# Patient Record
Sex: FEMALE | Race: BLACK OR AFRICAN AMERICAN | Employment: PART TIME | URBAN - METROPOLITAN AREA
[De-identification: names, ages, dates, MRNs, and addresses within clinical notes are randomized per-mention and may not be internally consistent; named-entity substitution may affect disease eponyms.]

---
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
## 2019-01-28 ENCOUNTER — HOSPITAL ENCOUNTER (EMERGENCY)
Age: 19
Discharge: HOME | End: 2019-01-28
Attending: EMERGENCY MEDICINE
Payer: SELF-PAY

## 2019-01-28 VITALS
WEIGHT: 186 LBS | RESPIRATION RATE: 16 BRPM | SYSTOLIC BLOOD PRESSURE: 121 MMHG | HEIGHT: 66 IN | BODY MASS INDEX: 29.89 KG/M2 | DIASTOLIC BLOOD PRESSURE: 74 MMHG | HEART RATE: 86 BPM | OXYGEN SATURATION: 98 % | TEMPERATURE: 98.6 F

## 2019-01-28 DIAGNOSIS — H10.31 ACUTE CONJUNCTIVITIS OF RIGHT EYE, UNSPECIFIED ACUTE CONJUNCTIVITIS TYPE: Primary | ICD-10-CM

## 2019-01-28 PROCEDURE — 99282 EMERGENCY DEPT VISIT SF MDM: CPT

## 2019-01-28 RX ORDER — ERYTHROMYCIN 5 MG/G
OINTMENT OPHTHALMIC
Qty: 1 G | Refills: 0 | Status: SHIPPED | OUTPATIENT
Start: 2019-01-28 | End: 2019-02-04

## 2019-01-28 NOTE — LETTER
NOTIFICATION OF RETURN TO WORK / SCHOOL 
1/28/2019 Ms. Julio Bernard [address] [facility] [address] To Whom it may concern, Please excuse Julio Bernard from work 01/28/19 for medical reasons. She may return as of 1/30/19.    
 
 
 
 
 
 
Sincerely,  
 
 
 
 
Jp Randhawa PA-C

## 2019-01-28 NOTE — DISCHARGE INSTRUCTIONS

## 2019-01-28 NOTE — ED TRIAGE NOTES
Pt. States \"I think I got pink eye\" observed redness and tearing to right eye. Reports symptoms started last night.

## 2019-01-28 NOTE — ED PROVIDER NOTES
EMERGENCY DEPARTMENT HISTORY AND PHYSICAL EXAM    1:27 PM      Date: 1/28/2019  Patient Name: Christina Danielle    History of Presenting Illness     Chief Complaint   Patient presents with    Red Eye     right eye         History Provided By: Patient    Chief Complaint: Eye redness   Duration:  Last night   Timing:  Constant  Location: right   Quality: Aching and Burning  Severity: Not reported   Modifying Factors: No modifying or aggravating factors were reported. Associated Symptoms: eye discharge (right)      Additional History (Context): Christina Danielle is a 22 y.o. female with PMHx of asthma who presents with c/o constant aching and burning right eye redness that started last night with associated Sx of eye drainage (right). Pt states her co worker had pink eye and she may have been exposed to something the coworker touched. Pt doesn't wear contacts. Pt denies visual disturbance. Denies any further complaints or symptoms at the moment. PCP: None    Current Outpatient Medications   Medication Sig Dispense Refill    erythromycin (ILOTYCIN) ophthalmic ointment One half inch (1.25cm) four times daily to affected eye for 5-7 days until symptoms improve 1 g 0    etonogestrel (NEXPLANON) 68 mg impl by SubDERmal route.  ibuprofen (MOTRIN) 600 mg tablet Take 1 Tab by mouth every six (6) hours as needed for Pain. 40 Tab 0    methocarbamol (ROBAXIN-750) 750 mg tablet Take 1 Tab by mouth every six (6) hours as needed. 40 Tab 0       Past History     Past Medical History:  Past Medical History:   Diagnosis Date    Asthma        Past Surgical History:  No past surgical history on file. Family History:  No family history on file. Social History:  Social History     Tobacco Use    Smoking status: Never Smoker   Substance Use Topics    Alcohol use: No    Drug use: No       Allergies:  No Known Allergies      Review of Systems       Review of Systems   Constitutional: Negative for fever.    HENT: Negative for facial swelling. Eyes: Positive for pain (right), discharge (right) and redness (right). Negative for visual disturbance. Respiratory: Negative for shortness of breath. Cardiovascular: Negative for chest pain. Gastrointestinal: Negative for abdominal pain. Genitourinary: Negative for dysuria. Musculoskeletal: Negative for neck pain. Skin: Negative for rash. Neurological: Negative for dizziness. Psychiatric/Behavioral: Negative for confusion. All other systems reviewed and are negative. Physical Exam     Visit Vitals  /74 (BP 1 Location: Left arm, BP Patient Position: Sitting)   Pulse 86   Temp 98.6 °F (37 °C)   Resp 16   Ht 5' 6\" (1.676 m)   Wt 84.4 kg (186 lb)   SpO2 98%   BMI 30.02 kg/m²         Physical Exam   Constitutional: She is oriented to person, place, and time. She appears well-developed and well-nourished. No distress. HENT:   Head: Normocephalic and atraumatic. Eyes: Conjunctivae are normal.   Right conjunctival injection with mild inflammation of upper and lower eyelids and yellow crusting discharge    Neck: Normal range of motion. Cardiovascular: Normal rate and regular rhythm. Pulmonary/Chest: Effort normal.   Abdominal: She exhibits no distension. Musculoskeletal: Normal range of motion. Neurological: She is alert and oriented to person, place, and time. Skin: Skin is warm and dry. She is not diaphoretic. Psychiatric: She has a normal mood and affect. Nursing note and vitals reviewed. Diagnostic Study Results     Labs -  No results found for this or any previous visit (from the past 12 hour(s)). Radiologic Studies -   No orders to display         Medical Decision Making   I am the first provider for this patient. I reviewed the vital signs, available nursing notes, past medical history, past surgical history, family history and social history. Vital Signs-Reviewed the patient's vital signs.       Records Reviewed: Nursing Notes and Old Medical Records (Time of Review: 1:27 PM)    ED Course: Progress Notes, Reevaluation, and Consults:      Provider Notes (Medical Decision Making): MDM       Pt has been reexamined. Treated for conjunctivitis. Patient has no new complaints, changes, or physical findings. Care plan outlined and precautions discussed. Results were reviewed with the patient. All medications were reviewed with the patient; will d/c home with erythromycin. All of pt's questions and concerns were addressed. Patient was instructed and agrees to follow up with pcp, as well as to return to the ED upon further deterioration. Patient is ready to go home. Diagnosis     Clinical Impression:   1. Acute conjunctivitis of right eye, unspecified acute conjunctivitis type        Disposition: Discharged      Follow-up Information     Follow up With Specialties Details Why Contact Info    [facility] EMERGENCY DEPT Emergency Medicine In 1 week If symptoms do not improve [address] [facility] [address]  [phone]    [facility] EMERGENCY DEPT Emergency Medicine  Immediately if symptoms worsen [address] [id number]  [phone]              Medication List      START taking these medications    erythromycin ophthalmic ointment  Commonly known as:  ILOTYCIN  One half inch (1.25cm) four times daily to affected eye for 5-7 days until symptoms improve        ASK your doctor about these medications    ibuprofen 600 mg tablet  Commonly known as:  MOTRIN  Take 1 Tab by mouth every six (6) hours as needed for Pain. methocarbamol 750 mg tablet  Commonly known as:  ROBAXIN-750  Take 1 Tab by mouth every six (6) hours as needed.      NEXPLANON 68 mg Impl  Generic drug:  etonogestrel           Where to Get Your Medications      Information about where to get these medications is not yet available    Ask your nurse or doctor about these medications  · erythromycin ophthalmic ointment _______________________________    Attestations:  Antoinetteелена Mary [address] acting as a scribe for and in the presence of Karen Jay PA-C      January 28, 2019 at 1:27 PM       Provider Attestation:      I personally performed the services described in the documentation, reviewed the documentation, as recorded by the scribe in my presence, and it accurately and completely records my words and actions.  January 28, 2019 at 1:27 PM - Karen Jay PA-C  _______________________________

## 2020-05-17 ENCOUNTER — HOSPITAL ENCOUNTER (EMERGENCY)
Age: 20
Discharge: STILL ADMITTED OR ADMINISTRATIVE | End: 2020-05-17

## 2020-05-30 ENCOUNTER — HOSPITAL ENCOUNTER (EMERGENCY)
Age: 20
Discharge: HOME | End: 2020-05-30
Attending: EMERGENCY MEDICINE
Payer: COMMERCIAL

## 2020-05-30 VITALS
HEIGHT: 66 IN | SYSTOLIC BLOOD PRESSURE: 117 MMHG | DIASTOLIC BLOOD PRESSURE: 80 MMHG | HEART RATE: 83 BPM | RESPIRATION RATE: 16 BRPM | BODY MASS INDEX: 31.18 KG/M2 | WEIGHT: 194 LBS | OXYGEN SATURATION: 98 % | TEMPERATURE: 98.9 F

## 2020-05-30 DIAGNOSIS — J03.90 ACUTE TONSILLITIS, UNSPECIFIED ETIOLOGY: Primary | ICD-10-CM

## 2020-05-30 PROCEDURE — 99281 EMR DPT VST MAYX REQ PHY/QHP: CPT

## 2020-05-30 RX ORDER — AMOXICILLIN 875 MG/1
875 TABLET, FILM COATED ORAL 2 TIMES DAILY
Qty: 20 TAB | Refills: 0 | Status: SHIPPED | OUTPATIENT
Start: 2020-05-30 | End: 2020-06-09

## 2020-05-30 NOTE — ED PROVIDER NOTES
EMERGENCY DEPARTMENT HISTORY AND PHYSICAL EXAM      Date: 5/30/2020  Patient Name: Clark Springer    History of Presenting Illness     Chief Complaint   Patient presents with    Sore Throat    Ear Pain       History Provided By: Patient    HPI: Clark Springer, 20 y.o. female PMHx significant for asthma presents ambulatory to the ED with cc of sore throat with associated left ear pain x3 days. Denies fever/chills, drainage, trouble breathing or swallowing. Denies known exposure to COVID. Patient has not taken anything for symptoms. Rates pain 8/10. There are no other complaints, changes, or physical findings at this time. PCP: None    No current facility-administered medications on file prior to encounter. Current Outpatient Medications on File Prior to Encounter   Medication Sig Dispense Refill    etonogestrel (NEXPLANON) 68 mg impl by SubDERmal route.  ibuprofen (MOTRIN) 600 mg tablet Take 1 Tab by mouth every six (6) hours as needed for Pain. 40 Tab 0    methocarbamol (ROBAXIN-750) 750 mg tablet Take 1 Tab by mouth every six (6) hours as needed. 40 Tab 0       Past History     Past Medical History:  Past Medical History:   Diagnosis Date    Asthma        Past Surgical History:  No past surgical history on file. Family History:  No family history on file. Social History:  Social History     Tobacco Use    Smoking status: Never Smoker   Substance Use Topics    Alcohol use: No    Drug use: No       Allergies:  No Known Allergies      Review of Systems   Review of Systems   Constitutional: Negative for chills and fever. HENT: Positive for ear pain and sore throat. Negative for congestion, dental problem, drooling and ear discharge. Respiratory: Negative for shortness of breath. Cardiovascular: Negative for chest pain. Gastrointestinal: Negative for abdominal pain, nausea and vomiting. Genitourinary: Negative for flank pain.    Musculoskeletal: Negative for back pain and myalgias. Skin: Negative for color change, pallor, rash and wound. Neurological: Negative for dizziness, weakness and light-headedness. All other systems reviewed and are negative. Physical Exam   Physical Exam  Vitals signs and nursing note reviewed. Constitutional:       General: She is not in acute distress. Appearance: She is well-developed. Comments: Patient well-appearing in NAD   HENT:      Head: Normocephalic and atraumatic. Ears:      Comments: TMs clear b/l     Mouth/Throat:      Comments: Tonsils 1+ with erythema b/l  No exudates  No pharyngeal swelling  Eyes:      Conjunctiva/sclera: Conjunctivae normal.   Cardiovascular:      Rate and Rhythm: Normal rate and regular rhythm. Heart sounds: Normal heart sounds. Pulmonary:      Effort: Pulmonary effort is normal. No respiratory distress. Breath sounds: Normal breath sounds. Abdominal:      General: Bowel sounds are normal. There is no distension. Palpations: Abdomen is soft. Musculoskeletal: Normal range of motion. Skin:     General: Skin is warm. Findings: No rash. Neurological:      Mental Status: She is alert and oriented to person, place, and time. Psychiatric:         Behavior: Behavior normal.         Diagnostic Study Results     Labs -   No results found for this or any previous visit (from the past 12 hour(s)). Radiologic Studies -   No orders to display     CT Results  (Last 48 hours)    None        CXR Results  (Last 48 hours)    None          Medical Decision Making   I am the first provider for this patient. I reviewed the vital signs, available nursing notes, past medical history, past surgical history, family history and social history. Vital Signs-Reviewed the patient's vital signs.   Patient Vitals for the past 12 hrs:   Temp Pulse Resp BP SpO2   05/30/20 1226 98.9 °F (37.2 °C) 83 16 117/80 98 %       Records Reviewed: Nursing Notes and Old Medical Records    Provider Notes (Medical Decision Making):   DDx: Tonsillitis, Pharyngitis, OM, OE    21 yo F who presents with sore throat and left ear pain x3 days. On physical exam tonsils are 1+ with erythema. No exudate. TMs clear bilaterally. Will treat with antibiotic to treat tonsillitis. Patient nontoxic-appearing, afebrile, not tachycardic. Patient stable for prompt outpatient follow-up with PCP in 1-2 days. Patient given strict instructions to return if symptoms worsen. ED Course:   Initial assessment performed. The patients presenting problems have been discussed, and they are in agreement with the care plan formulated and outlined with them. I have encouraged them to ask questions as they arise throughout their visit. Disposition:  Discussed dx and treatment plan. Discussed importance of PCP follow up. All questions answered. Pt voiced they understood. Return if sx worsen. PLAN:  1. Current Discharge Medication List      START taking these medications    Details   amoxicillin (AMOXIL) 875 mg tablet Take 1 Tab by mouth two (2) times a day for 10 days. Qty: 20 Tab, Refills: 0           2. Follow-up Information     Follow up With Specialties Details Why [address] [facility]  Schedule an appointment as soon as possible for a visit in 1 day  Jose Hong [address]  [address]  [phone]        Return to ED if worse     Diagnosis     Clinical Impression:   1. Acute tonsillitis, unspecified etiology        Attestations:    Reather Cranker, PA    Please note that this dictation was completed with Moxie, the computer voice recognition software. Quite often unanticipated grammatical, syntax, homophones, and other interpretive errors are inadvertently transcribed by the computer software. Please disregard these errors. Please excuse any errors that have escaped final proofreading. Thank you.

## 2020-05-30 NOTE — DISCHARGE INSTRUCTIONS
Patient Education        Tonsillitis: Care Instructions  Your Care Instructions     Tonsillitis is an infection of the tonsils that is caused by bacteria or a virus. The tonsils are in the back of the throat and are part of the immune system. Tonsillitis typically lasts from a few days up to a couple of weeks. Tonsillitis caused by a virus goes away on its own. Tonsillitis caused by the bacteria that causes strep throat is treated with antibiotics. You and your doctor may consider surgery to remove the tonsils (tonsillectomy) if you have serious complications or repeat infections. Follow-up care is a key part of your treatment and safety. Be sure to make and go to all appointments, and call your doctor if you are having problems. It's also a good idea to know your test results and keep a list of the medicines you take. How can you care for yourself at home? · If your doctor prescribed antibiotics, take them as directed. Do not stop taking them just because you feel better. You need to take the full course of antibiotics. · Gargle with warm salt water. This helps reduce swelling and relieve discomfort. Gargle once an hour with 1 teaspoon of salt mixed in 8 fluid ounces of warm water. · Take an over-the-counter pain medicine, such as acetaminophen (Tylenol), ibuprofen (Advil, Motrin), or naproxen (Aleve). Be safe with medicines. Read and follow all instructions on the label. No one younger than 20 should take aspirin. It has been linked to Reye syndrome, a serious illness. · Be careful when taking over-the-counter cold or flu medicines and Tylenol at the same time. Many of these medicines have acetaminophen, which is Tylenol. Read the labels to make sure that you are not taking more than the recommended dose. Too much acetaminophen (Tylenol) can be harmful. · Try an over-the-counter throat spray to relieve throat pain. · Drink plenty of fluids. Fluids may help soothe an irritated throat.  Drink warm or cool liquids (whichever feels better). These include tea, soup, and juice. · Do not smoke, and avoid secondhand smoke. Smoking can make tonsillitis worse. If you need help quitting, talk to your doctor about stop-smoking programs and medicines. These can increase your chances of quitting for good. · Use a vaporizer or humidifier to add moisture to your bedroom. Follow the directions for cleaning the machine. When should you call for help? Call your doctor now or seek immediate medical care if:  · Your pain gets worse on one side of your throat. · You have a new or higher fever. · You notice changes in your voice. · You have trouble opening your mouth. · You have any trouble breathing. · You have much more trouble swallowing. · You have a fever with a stiff neck or a severe headache. · You are sensitive to light or feel very sleepy or confused. Watch closely for changes in your health, and be sure to contact your doctor if:  · You do not get better after 2 days. Where can you learn more? Go to http://reinaldo-gil.info/  Enter M655 in the search box to learn more about \"Tonsillitis: Care Instructions. \"  Current as of: July 29, 2019               Content Version: 12.5  © 6719-9129 Health[employer] Inc[employer]. Care instructions adapted under license by [employer] (which disclaims liability or warranty for this information). If you have questions about a medical condition or this instruction, always ask your healthcare professional. [place] [address] any warranty or liability for your use of this information.

## 2020-06-09 ENCOUNTER — HOSPITAL ENCOUNTER (EMERGENCY)
Age: 20
Discharge: HOME | End: 2020-06-10
Attending: EMERGENCY MEDICINE
Payer: COMMERCIAL

## 2020-06-09 VITALS
TEMPERATURE: 99 F | RESPIRATION RATE: 11 BRPM | HEART RATE: 83 BPM | DIASTOLIC BLOOD PRESSURE: 76 MMHG | BODY MASS INDEX: 31.02 KG/M2 | OXYGEN SATURATION: 98 % | SYSTOLIC BLOOD PRESSURE: 113 MMHG | HEIGHT: 66 IN | WEIGHT: 193 LBS

## 2020-06-09 DIAGNOSIS — H10.32 ACUTE CONJUNCTIVITIS OF LEFT EYE, UNSPECIFIED ACUTE CONJUNCTIVITIS TYPE: Primary | ICD-10-CM

## 2020-06-09 PROCEDURE — 99283 EMERGENCY DEPT VISIT LOW MDM: CPT

## 2020-06-09 RX ORDER — ERYTHROMYCIN 5 MG/G
OINTMENT OPHTHALMIC
Qty: 1 TUBE | Refills: 0 | Status: SHIPPED | OUTPATIENT
Start: 2020-06-09 | End: 2020-06-16

## 2020-06-09 RX ORDER — PROPARACAINE HYDROCHLORIDE 5 MG/ML
2 SOLUTION/ DROPS OPHTHALMIC
Status: DISCONTINUED | OUTPATIENT
Start: 2020-06-09 | End: 2020-06-10 | Stop reason: HOSPADM

## 2020-06-09 RX ORDER — ERYTHROMYCIN 5 MG/G
OINTMENT OPHTHALMIC
Qty: 1 TUBE | Refills: 0 | Status: SHIPPED | OUTPATIENT
Start: 2020-06-09 | End: 2020-06-09

## 2020-06-09 NOTE — LETTER
[address] [place] [address] [address] [facility] EMERGENCY DEPT 
[address] [facility] [address] [phone] Work/School Note Date: 6/9/2020 To Whom It May concern: 
 
Julita Fry was seen and treated today in the emergency room by the following provider(s): 
Attending Provider: Elizabeth Mahmood MD 
Nurse Practitioner: Austin Campos NP. Julita Fry may return to work on 06/12/2020. Sincerely, Raul Schmidt NP

## 2020-06-10 RX ORDER — ERYTHROMYCIN 5 MG/G
OINTMENT OPHTHALMIC
Qty: 3.5 G | Refills: 0 | Status: SHIPPED | OUTPATIENT
Start: 2020-06-10 | End: 2020-06-17

## 2020-06-10 NOTE — DISCHARGE INSTRUCTIONS
Patient Education        Pinkeye: Care Instructions  Your Care Instructions    Pinkeye is redness and swelling of the eye surface and the conjunctiva (the lining of the eyelid and the covering of the white part of the eye). Pinkeye is also called conjunctivitis. Pinkeye is often caused by infection with bacteria or a virus. Dry air, allergies, smoke, and chemicals are other common causes. Pinkeye often clears on its own in 7 to 10 days. Antibiotics only help if the pinkeye is caused by bacteria. Pinkeye caused by infection spreads easily. If an allergy or chemical is causing pinkeye, it will not go away unless you can avoid whatever is causing it. Follow-up care is a key part of your treatment and safety. Be sure to make and go to all appointments, and call your doctor if you are having problems. It's also a good idea to know your test results and keep a list of the medicines you take. How can you care for yourself at home? · Wash your hands often. Always wash them before and after you treat pinkeye or touch your eyes or face. · Use moist cotton or a clean, wet cloth to remove crust. Wipe from the inside corner of the eye to the outside. Use a clean part of the cloth for each wipe. · Put cold or warm wet cloths on your eye a few times a day if the eye hurts. · Do not wear contact lenses or eye makeup until the pinkeye is gone. Throw away any eye makeup you were using when you got pinkeye. Clean your contacts and storage case. If you wear disposable contacts, use a new pair when your eye has cleared and it is safe to wear contacts again. · If the doctor gave you antibiotic ointment or eyedrops, use them as directed. Use the medicine for as long as instructed, even if your eye starts looking better soon. Keep the bottle tip clean, and do not let it touch the eye area. · To put in eyedrops or ointment:  ? Tilt your head back, and pull your lower eyelid down with one finger. ?  Drop or squirt the medicine inside the lower lid. ? Close your eye for 30 to 60 seconds to let the drops or ointment move around. ? Do not touch the ointment or dropper tip to your eyelashes or any other surface. · Do not share towels, pillows, or washcloths while you have pinkeye. When should you call for help? Call your doctor now or seek immediate medical care if:  · You have pain in your eye, not just irritation on the surface. · You have a change in vision or loss of vision. · You have an increase in discharge from the eye. · Your eye has not started to improve or begins to get worse within 48 hours after you start using antibiotics. · Pinkeye lasts longer than 7 days. Watch closely for changes in your health, and be sure to contact your doctor if you have any problems. Where can you learn more? Go to http://reinaldo-gil.info/  Enter Y392 in the search box to learn more about \"Pinkeye: Care Instructions. \"  Current as of: June 26, 2019               Content Version: 12.5  © 9109-8025 [employer]. Care instructions adapted under license by [employer] (which disclaims liability or warranty for this information). If you have questions about a medical condition or this instruction, always ask your healthcare professional. [address] any warranty or liability for your use of this information. MyChart Activation    Thank you for requesting access to [employer]. Please follow the instructions below to securely access and download your online medical record. [employer] allows you to send messages to your doctor, view your test results, renew your prescriptions, schedule appointments, and more. How Do I Sign Up? 1. In your internet browser, go to [email or web address]  2. Click on the First Time User? Click Here link in the Sign In box. You will be redirect to the New Member Sign Up page. 3. Enter your [employer] Access Code exactly as it appears below.  You will not need to use this code after youve completed the sign-up process. If you do not sign up before the expiration date, you must request a new code. [employer] Access Code: [email or web address]  Expires: 2020 12:35 PM (This is the date your [employer] access code will )    4. Enter the last four digits of your Social Security Number (xxxx) and Date of Birth (mm/dd/yyyy) as indicated and click Submit. You will be taken to the next sign-up page. 5. Create a [employer] ID. This will be your [employer] login ID and cannot be changed, so think of one that is secure and easy to remember. 6. Create a [employer] password. You can change your password at any time. 7. Enter your Password Reset Question and Answer. This can be used at a later time if you forget your password. 8. Enter your e-mail address. You will receive e-mail notification when new information is available in [address]. 9. Click Sign Up. You can now view and download portions of your medical record. 10. Click the Download Summary menu link to download a portable copy of your medical information. Additional Information    If you have questions, please visit the Frequently Asked Questions section of the [employer] website at [email or web address] Remember, [employer] is NOT to be used for urgent needs. For medical emergencies, dial 911.

## 2020-06-10 NOTE — ED PROVIDER NOTES
EMERGENCY DEPARTMENT HISTORY AND PHYSICAL EXAM    10:00 PM      Date: 6/9/2020  Patient Name: Carlos Berg    History of Presenting Illness     Chief Complaint   Patient presents with    Eye Pain         History Provided By: Patient      Additional History (Context): Carlos Berg is a 20 y.o. female with a PMHx Asthma arrived to ER c/o left eye d/c, intermittent irritation, occasional photophobia, mild pain, and redness x5 days. Denies use of contact lens. She reports has prescription glasses but hasn't purchased them yet. Denies eye trauma, eye pressure, diplopia. Denies any other concerns at this time. PCP: None    Chief Complaint: left eye d/c, intermittent irritation, and redness  Duration:  Days  Timing:  Acute  Location: left eye  Quality: irritation  Severity: Mild  Modifying Factors: OTC stye relief with moderate relief. Associated Symptoms: denies any other associated signs or symptoms        Past History     Past Medical History:  Past Medical History:   Diagnosis Date    Asthma        Past Surgical History:  History reviewed. No pertinent surgical history. Family History:  History reviewed. No pertinent family history. Social History:  Social History     Tobacco Use    Smoking status: Never Smoker   Substance Use Topics    Alcohol use: No    Drug use: No       Allergies:  No Known Allergies      Review of Systems       Review of Systems   Constitutional: Negative for activity change, appetite change, chills, fatigue and fever. Eyes: Positive for photophobia, pain, discharge, redness and itching. Negative for visual disturbance. Respiratory: Negative for cough and shortness of breath. Cardiovascular: Negative. Negative for chest pain, palpitations and leg swelling. Gastrointestinal: Negative for abdominal distention, abdominal pain, blood in stool, constipation, diarrhea, nausea and vomiting. Genitourinary: Negative for frequency. Musculoskeletal: Negative for back pain. Skin: Negative for rash. Neurological: Negative for dizziness, light-headedness and headaches. Psychiatric/Behavioral: Negative for suicidal ideas. Physical Exam     Visit Vitals  /76 (BP 1 Location: Left arm, BP Patient Position: Sitting)   Pulse 83   Temp 99 °F (37.2 °C)   Resp 11   Ht 5' 6\" (1.676 m)   Wt 87.5 kg (193 lb)   SpO2 98%   BMI 31.15 kg/m²       Physical Exam  Vitals signs and nursing note reviewed. Constitutional:       General: She is not in acute distress. Appearance: Normal appearance. She is normal weight. She is not ill-appearing, toxic-appearing or diaphoretic. HENT:      Right Ear: Hearing, tympanic membrane, ear canal and external ear normal.      Left Ear: Hearing, tympanic membrane, ear canal and external ear normal.      Nose: Nose normal.      Mouth/Throat:      Lips: Pink. Mouth: Mucous membranes are moist.      Pharynx: Oropharynx is clear. Uvula midline. Eyes:      General: Lids are normal.         Left eye: Discharge (scant amount of yellow d/c) present. Extraocular Movements: Extraocular movements intact. Right eye: Normal extraocular motion and no nystagmus. Left eye: Normal extraocular motion and no nystagmus. Conjunctiva/sclera:      Right eye: Right conjunctiva is not injected. No chemosis, exudate or hemorrhage. Left eye: Left conjunctiva is not injected. Exudate present. No chemosis or hemorrhage. Pupils: Pupils are equal, round, and reactive to light. Comments: Mild erythema of left conjunctiva   Cardiovascular:      Rate and Rhythm: Normal rate and regular rhythm. Heart sounds: Normal heart sounds. No murmur. No friction rub. No gallop. Pulmonary:      Effort: Pulmonary effort is normal. No respiratory distress. Breath sounds: Normal breath sounds. No stridor. No wheezing, rhonchi or rales. Chest:      Chest wall: No tenderness. Musculoskeletal: Normal range of motion.    Skin:     General: Skin is warm and dry. Capillary Refill: Capillary refill takes less than 2 seconds. Neurological:      Mental Status: She is alert and oriented to person, place, and time. Psychiatric:         Attention and Perception: Attention and perception normal.         Mood and Affect: Mood normal.         Speech: Speech normal.         Behavior: Behavior normal.         Thought Content: Thought content normal.         Cognition and Memory: Cognition and memory normal.         Judgment: Judgment normal.           Diagnostic Study Results     Labs -none  No results found for this or any previous visit (from the past 12 hour(s)). Radiologic Studies -  none  No orders to display         Medical Decision Making     It should be noted that Sarah PRITCHETT Reem A, MD and JALEN Wynn NP  will be the provider of record for this patient. I reviewed the vital signs, available nursing notes, past medical history, past surgical history, family history and social history. Vital Signs-Reviewed the patient's vital signs. Records Reviewed: Old Medical Records (Time of Review: 10:00 PM)    ED Course: Progress Notes, Reevaluation, and Consults:      Provider Notes (Medical Decision Making):   DDX:  Blepharitis  Dry eyes  Episcleritis  Iritis    Clinical Impression/plan: Patient stable condition. Will prescribe erythromycin ophthalmic. Encourage patient to follow-up with her ophthalmologist in 2 to 4 days. Return to ER immediately for any worsening symptoms or concerns. Patient verbalizes discharge instructions. Diagnosis     Clinical Impression:   1.  Acute conjunctivitis of left eye, unspecified acute conjunctivitis type        Disposition: Home    Follow-up Information     Follow up With Specialties Details Why Eric [address]  Schedule an appointment as soon as possible for a visit in 2 days  Anat Chang MD Ophthalmology Schedule an appointment as soon as possible for a visit in 2 days  [address] [facility], [place], [facility] [phone]      Return to ER immediately for any worsening symptoms or concerns. Patient's Medications   Start Taking    ERYTHROMYCIN (ILOTYCIN) OPHTHALMIC OINTMENT    Apply 1/2 inch ribbon to left eye three times a day for 7 days   Continue Taking    No medications on file   These Medications have changed    No medications on file   Stop Taking    AMOXICILLIN (AMOXIL) 875 MG TABLET    Take 1 Tab by mouth two (2) times a day for 10 days. ETONOGESTREL (NEXPLANON) 68 MG IMPL    by SubDERmal route. IBUPROFEN (MOTRIN) 600 MG TABLET    Take 1 Tab by mouth every six (6) hours as needed for Pain. METHOCARBAMOL (ROBAXIN-750) 750 MG TABLET    Take 1 Tab by mouth every six (6) hours as needed.      _______________________________

## 2021-03-14 ENCOUNTER — HOSPITAL ENCOUNTER (EMERGENCY)
Age: 21
Discharge: HOME | End: 2021-03-14
Attending: EMERGENCY MEDICINE

## 2021-03-14 VITALS
HEART RATE: 73 BPM | BODY MASS INDEX: 29.73 KG/M2 | WEIGHT: 185 LBS | OXYGEN SATURATION: 100 % | SYSTOLIC BLOOD PRESSURE: 129 MMHG | DIASTOLIC BLOOD PRESSURE: 76 MMHG | HEIGHT: 66 IN | TEMPERATURE: 98.5 F | RESPIRATION RATE: 18 BRPM

## 2021-03-14 DIAGNOSIS — K08.89 PAIN, DENTAL: Primary | ICD-10-CM

## 2021-03-14 PROCEDURE — 99281 EMR DPT VST MAYX REQ PHY/QHP: CPT

## 2021-03-14 RX ORDER — AMOXICILLIN 500 MG/1
500 TABLET, FILM COATED ORAL 3 TIMES DAILY
Qty: 30 TAB | Refills: 0 | Status: SHIPPED | OUTPATIENT
Start: 2021-03-14 | End: 2021-03-24

## 2021-03-14 RX ORDER — HYDROCODONE BITARTRATE AND ACETAMINOPHEN 5; 325 MG/1; MG/1
1 TABLET ORAL
Qty: 9 TAB | Refills: 0 | Status: SHIPPED | OUTPATIENT
Start: 2021-03-14 | End: 2021-03-17

## 2021-03-14 NOTE — DISCHARGE INSTRUCTIONS
Please follow-up with one of the dental clinics listed below:    [address] [facility] [phone]  [facility] [phone]  [facility] [phone] (For Extractions Only)  [address] [facility] [phone]  [facility] [phone] [address] [phone]    Call to schedule an appointment.

## 2021-03-14 NOTE — Clinical Note
[facility] EMERGENCY DEPT 
[address] [facility] [address] [phone] Work/School Note Date: 3/14/2021 To Whom It May concern: 
 
Baljit Jeffers was seen and treated today in the emergency room by the following provider(s): 
Attending Provider: Murray Alcantara DO Physician Assistant: Terrell Leyva PA-C. Baljit Jeffers is excused from work/school on 3/14/2021 through 3/17/2021. She is medically clear to return to work/school on 3/18/2021. Sincerely, Bhavana Lennon PA-C

## 2021-03-14 NOTE — ED TRIAGE NOTES
Pt states left sided mouth/tooth pain x 2 days. Unknown if broken tooth, but possible cavity.  Has been using OTC with no improvement

## 2021-03-14 NOTE — ED PROVIDER NOTES
EMERGENCY DEPARTMENT HISTORY AND PHYSICAL EXAM    2:47 PM      Date: 3/14/2021  Patient Name: Brandi Pat    History of Presenting Illness     Chief Complaint   Patient presents with    Dental Pain       History Provided By: Patient    Chief Complaint: dental pain  Duration:  Days  Timing:  Acute  Location:   Quality: Aching  Severity: Moderate  Modifying Factors: no improvement with Tylenol  Associated Symptoms: denies any other associated signs or symptoms      Additional History (Context):Charisse Rendon is a 23 y.o. female who presents to the emergency department for evaluation of left upper dental pain for the past few days. Patient reports she may have a cavity there. No improvement with over-the-counter Tylenol. Patient states that she does not have a dentist.  No recent fevers or chills, chest pain or shortness of breath, cough, URI symptoms, facial swelling, possibility of pregnancy, or any other complaints. PCP:  None      Current Outpatient Medications   Medication Sig Dispense Refill    HYDROcodone-acetaminophen (Norco) 5-325 mg per tablet Take 1 Tab by mouth every eight (8) hours as needed for Pain for up to 3 days. Max Daily Amount: 3 Tabs. 9 Tab 0    amoxicillin 500 mg tab Take 500 mg by mouth three (3) times daily for 10 days. 30 Tab 0       Past History     Past Medical History:  Past Medical History:   Diagnosis Date    Asthma        Past Surgical History:  No past surgical history on file. Family History:  No family history on file. Social History:  Social History     Tobacco Use    Smoking status: Never Smoker   Substance Use Topics    Alcohol use: No    Drug use: No       Allergies:  No Known Allergies      Review of Systems       Review of Systems   Constitutional: Negative for chills and fever. HENT: Positive for dental problem. Negative for congestion, facial swelling, rhinorrhea and sore throat. Respiratory: Negative for cough and shortness of breath.     Cardiovascular: Negative for chest pain. Gastrointestinal: Negative for abdominal pain, blood in stool, constipation, diarrhea, nausea and vomiting. Genitourinary: Negative for dysuria, frequency and hematuria. Musculoskeletal: Negative for back pain and myalgias. Skin: Negative for rash and wound. Neurological: Negative for dizziness and headaches. All other systems reviewed and are negative. Physical Exam     Visit Vitals  /76 (BP 1 Location: Left upper arm, BP Patient Position: At rest)   Pulse 73   Temp 98.5 °F (36.9 °C)   Resp 18   Ht 5' 6\" (1.676 m)   Wt 83.9 kg (185 lb)   SpO2 100%   BMI 29.86 kg/m²       Physical Exam  Vitals signs and nursing note reviewed. Constitutional:       General: She is not in acute distress. Appearance: She is well-developed. She is not diaphoretic. HENT:      Head: Normocephalic and atraumatic. Mouth/Throat:      Comments: Diffusely poor dentition. No localized induration or fluctuance to suggest drainable abscess. No sublingual/submandibular induration, trismus, or stridor. Normal speech. Handling oral secretions without difficulty. Pt with full ROM of neck. Eyes:      Conjunctiva/sclera: Conjunctivae normal.   Neck:      Musculoskeletal: Normal range of motion and neck supple. Cardiovascular:      Rate and Rhythm: Normal rate and regular rhythm. Heart sounds: Normal heart sounds. Pulmonary:      Effort: Pulmonary effort is normal. No respiratory distress. Breath sounds: Normal breath sounds. Chest:      Chest wall: No tenderness. Musculoskeletal:         General: No swelling, tenderness, deformity or signs of injury. Right lower leg: No edema. Left lower leg: No edema. Skin:     General: Skin is warm and dry. Neurological:      Mental Status: She is alert and oriented to person, place, and time. Deep Tendon Reflexes: Reflexes are normal and symmetric.          Diagnostic Study Results     Labs -  No results found for this or any previous visit (from the past 12 hour(s)). Radiologic Studies -   No results found. Medical Decision Making   I am the first provider for this patient. I reviewed the vital signs, available nursing notes, past medical history, past surgical history, family history and social history. Vital Signs-Reviewed the patient's vital signs. Pulse Oximetry Analysis -  100% on room air (Interpretation)    Records Reviewed: Nursing Notes and Old Medical Records (Time of Review: 2:47 PM)    ED Course: Progress Notes, Reevaluation, and Consults:    Provider Notes (Medical Decision Making):   Differential Diagnosis:  Dentalgia, dental caries, dental fracture, periodontal abscess/cellulitis, gingivitis, facial abscess/cellulitis    Plan:  Pt presents ambulatory in NAD, well-hydrated, non-toxic in appearance, with normal vitals. Exam reveals diffusely poor dentition without localized induration or fluctuance to suggest drainable abscess. No sublingual/submandibular induration, trismus, or stridor. Normal speech. Handling oral secretions without difficulty. Pt with full ROM of neck. Low suspicion for Jonathan's angina or deep space infection. Will DC home with norco, amoxicillin. Pt is strongly advised to follow-up with dentist in short period of time as they will need definitive management. At this time, patient is stable and appropriate for discharge home. Patient demonstrates understanding of current diagnoses and is in agreement with the treatment plan. They are advised that while the likelihood of serious underlying condition is low at this point given the evaluation performed today, we cannot fully rule it out. They are advised to immediately return with any new symptoms or worsening of current condition. All questions have been answered. Patient is given educational material regarding their diagnoses, including danger symptoms and when to return to the ED.        Diagnosis     Clinical Impression:   1. Pain, dental        Disposition: DC Home    Follow-up Information     Follow up With Specialties Details Why Contact Info    [facility] EMERGENCY DEPT Emergency Medicine Go to  As needed [address]  [phone]           Patient's Medications   Start Taking    AMOXICILLIN 500 MG TAB    Take 500 mg by mouth three (3) times daily for 10 days. HYDROCODONE-ACETAMINOPHEN (NORCO) 5-325 MG PER TABLET    Take 1 Tab by mouth every eight (8) hours as needed for Pain for up to 3 days. Max Daily Amount: 3 Tabs. Continue Taking    No medications on file   These Medications have changed    No medications on file   Stop Taking    No medications on file     _______________________________    This note was dictated utilizing voice recognition software which may lead to typographical errors. I apologize in advance if the situation occurs. If questions arise please do not hesitate to contact me or call our department.   Steve James PA-C

## 2021-05-27 ENCOUNTER — HOSPITAL ENCOUNTER (EMERGENCY)
Age: 21
Discharge: HOME | End: 2021-05-27
Attending: STUDENT IN AN ORGANIZED HEALTH CARE EDUCATION/TRAINING PROGRAM

## 2021-05-27 VITALS
WEIGHT: 195 LBS | DIASTOLIC BLOOD PRESSURE: 70 MMHG | HEIGHT: 66 IN | RESPIRATION RATE: 14 BRPM | SYSTOLIC BLOOD PRESSURE: 108 MMHG | OXYGEN SATURATION: 100 % | HEART RATE: 83 BPM | TEMPERATURE: 99 F | BODY MASS INDEX: 31.34 KG/M2

## 2021-05-27 DIAGNOSIS — N76.0 BV (BACTERIAL VAGINOSIS): ICD-10-CM

## 2021-05-27 DIAGNOSIS — B96.89 BV (BACTERIAL VAGINOSIS): ICD-10-CM

## 2021-05-27 DIAGNOSIS — Z34.90 PREGNANCY, UNSPECIFIED GESTATIONAL AGE: Primary | ICD-10-CM

## 2021-05-27 DIAGNOSIS — B37.31 YEAST VAGINITIS: ICD-10-CM

## 2021-05-27 LAB
APPEARANCE UR: ABNORMAL
BACTERIA URNS QL MICRO: ABNORMAL /HPF
BILIRUB UR QL: NEGATIVE
COLOR UR: YELLOW
EPITH CASTS URNS QL MICRO: ABNORMAL /LPF (ref 0–5)
GLUCOSE UR STRIP.AUTO-MCNC: NEGATIVE MG/DL
HCG UR QL: POSITIVE
HGB UR QL STRIP: NEGATIVE
KETONES UR QL STRIP.AUTO: ABNORMAL MG/DL
LEUKOCYTE ESTERASE UR QL STRIP.AUTO: ABNORMAL
NITRITE UR QL STRIP.AUTO: NEGATIVE
PH UR STRIP: 7 [PH] (ref 5–8)
PROT UR STRIP-MCNC: ABNORMAL MG/DL
RBC #/AREA URNS HPF: NEGATIVE /HPF (ref 0–5)
SERVICE CMNT-IMP: NORMAL
SP GR UR REFRACTOMETRY: >1.03 (ref 1–1.03)
UROBILINOGEN UR QL STRIP.AUTO: 1 EU/DL (ref 0.2–1)
WBC URNS QL MICRO: ABNORMAL /HPF (ref 0–4)
WET PREP GENITAL: NORMAL

## 2021-05-27 PROCEDURE — 99283 EMERGENCY DEPT VISIT LOW MDM: CPT

## 2021-05-27 PROCEDURE — 81001 URINALYSIS AUTO W/SCOPE: CPT

## 2021-05-27 PROCEDURE — 87210 SMEAR WET MOUNT SALINE/INK: CPT

## 2021-05-27 PROCEDURE — 87491 CHLMYD TRACH DNA AMP PROBE: CPT

## 2021-05-27 PROCEDURE — 74011250637 HC RX REV CODE- 250/637: Performed by: PHYSICIAN ASSISTANT

## 2021-05-27 PROCEDURE — 81025 URINE PREGNANCY TEST: CPT

## 2021-05-27 RX ORDER — ONDANSETRON 4 MG/1
4 TABLET, ORALLY DISINTEGRATING ORAL
Qty: 10 TABLET | Refills: 0 | Status: SHIPPED | OUTPATIENT
Start: 2021-05-27 | End: 2021-07-02

## 2021-05-27 RX ORDER — ONDANSETRON 8 MG/1
4 TABLET, ORALLY DISINTEGRATING ORAL
Status: COMPLETED | OUTPATIENT
Start: 2021-05-27 | End: 2021-05-27

## 2021-05-27 RX ORDER — METRONIDAZOLE 500 MG/1
500 TABLET ORAL 2 TIMES DAILY
Qty: 14 TABLET | Refills: 0 | OUTPATIENT
Start: 2021-05-27 | End: 2021-06-01

## 2021-05-27 RX ORDER — ASPIRIN 325 MG
1 TABLET, DELAYED RELEASE (ENTERIC COATED) ORAL
Qty: 7 APPLICATOR | Refills: 0 | OUTPATIENT
Start: 2021-05-27 | End: 2021-06-01

## 2021-05-27 RX ADMIN — ONDANSETRON 4 MG: 8 TABLET, ORALLY DISINTEGRATING ORAL at 13:16

## 2021-05-27 NOTE — ED PROVIDER NOTES
EMERGENCY DEPARTMENT HISTORY AND PHYSICAL EXAM      Date: 5/27/2021  Patient Name: Dimas Ramos    History of Presenting Illness     Chief Complaint   Patient presents with    Vomiting       History Provided By: Patient    HPI: Dimas Ramos, 23 y.o. female PMHx significant for asthma presents ambulatory to the ED. patient reports multiple bouts of nonbloody emesis \"over the past few days\". Reports intermittent generalized abdominal cramping. LMP 4/12. Patient has not taken pregnancy test.  Denies dysuria, hematuria. Patient reports urinary frequency. Denies change in vaginal discharge. Patient is not taking birth control and unsure if she was pregnant. Denies previous pregnancies. Denies previous abdominal surgeries. Denies vaginal bleeding. There are no other complaints, changes, or physical findings at this time. PCP: None    No current facility-administered medications on file prior to encounter. No current outpatient medications on file prior to encounter. Past History     Past Medical History:  Past Medical History:   Diagnosis Date    Asthma        Past Surgical History:  History reviewed. No pertinent surgical history. Family History:  History reviewed. No pertinent family history. Social History:  Social History     Tobacco Use    Smoking status: Never Smoker    Smokeless tobacco: Never Used   Substance Use Topics    Alcohol use: No    Drug use: No       Allergies:  No Known Allergies      Review of Systems   Review of Systems   Constitutional: Negative for chills and fever. Respiratory: Negative for shortness of breath. Cardiovascular: Negative for chest pain. Gastrointestinal: Positive for nausea and vomiting. Negative for abdominal pain. Generalized abd cramping   Genitourinary: Positive for frequency. Negative for flank pain. Musculoskeletal: Negative for back pain and myalgias. Skin: Negative for color change, pallor, rash and wound.    Neurological: Negative for dizziness, weakness and light-headedness. All other systems reviewed and are negative. Physical Exam   Physical Exam  Vitals and nursing note reviewed. Constitutional:       General: She is not in acute distress. Appearance: She is well-developed. Comments: Patient in NAD   HENT:      Head: Normocephalic and atraumatic. Eyes:      Conjunctiva/sclera: Conjunctivae normal.   Cardiovascular:      Rate and Rhythm: Normal rate and regular rhythm. Heart sounds: Normal heart sounds. Pulmonary:      Effort: Pulmonary effort is normal. No respiratory distress. Breath sounds: Normal breath sounds. Abdominal:      General: Bowel sounds are normal. There is no distension. Palpations: Abdomen is soft. Tenderness: There is no abdominal tenderness. Comments: Abdomen soft nondistended  Nontender  No guarding or rigidity   Musculoskeletal:         General: Normal range of motion. Skin:     General: Skin is warm. Findings: No rash. Neurological:      Mental Status: She is alert and oriented to person, place, and time.    Psychiatric:         Behavior: Behavior normal.         Diagnostic Study Results     Labs -     Recent Results (from the past 12 hour(s))   URINALYSIS W/ RFLX MICROSCOPIC    Collection Time: 05/27/21  1:06 PM   Result Value Ref Range    Color YELLOW      Appearance CLOUDY      Specific gravity >1.030 (H) 1.005 - 1.030    pH (UA) 7.0 5.0 - 8.0      Protein TRACE (A) NEG mg/dL    Glucose Negative NEG mg/dL    Ketone TRACE (A) NEG mg/dL    Bilirubin Negative NEG      Blood Negative NEG      Urobilinogen 1.0 0.2 - 1.0 EU/dL    Nitrites Negative NEG      Leukocyte Esterase MODERATE (A) NEG     HCG URINE, QL    Collection Time: 05/27/21  1:06 PM   Result Value Ref Range    HCG urine, QL Positive (A) NEG     WET PREP    Collection Time: 05/27/21  1:06 PM    Specimen: Vagina   Result Value Ref Range    Special Requests: NO SPECIAL REQUESTS      Wet prep MODERATE  CLUE CELLS PRESENT        Wet prep MANY  YEAST        Wet prep NO TRICHOMONAS SEEN     URINE MICROSCOPIC ONLY    Collection Time: 05/27/21  1:06 PM   Result Value Ref Range    WBC 0 to 4 0 - 4 /hpf    RBC Negative 0 - 5 /hpf    Epithelial cells 3+ 0 - 5 /lpf    Bacteria 3+ (A) NEG /hpf       Radiologic Studies -   No orders to display     CT Results  (Last 48 hours)    None        CXR Results  (Last 48 hours)    None          Medical Decision Making   I am the first provider for this patient. I reviewed the vital signs, available nursing notes, past medical history, past surgical history, family history and social history. Vital Signs-Reviewed the patient's vital signs. Patient Vitals for the past 12 hrs:   Temp Pulse Resp BP SpO2   05/27/21 1347 -- 83 -- -- --   05/27/21 1318 -- -- -- -- 100 %   05/27/21 1305 99 °F (37.2 °C) -- 14 108/70 100 %         Records Reviewed: Nursing Notes and Old Medical Records    Provider Notes (Medical Decision Making):   DDx: Pregnancy, UTI, Gonorrhea, Chlamydia, Trich, BV, Yeast    22 yo F who presents with nausea, vomiting, generalized abdominal pain and missed menstrual cycle. On exam no abdominal tenderness. Very low level of concern for ectopic pregnancy due to lack of abdominal tenderness on exam.  Low level concern for miscarriage due to lack of vaginal bleeding. Pregnancy test positive. Wet prep shows clue cells and yeast. Will treat with flagyl and clotrimazole which are safe in pregnancy. Will prescribe prenatal vitamins, Zofran for vomiting and treatment for BV and yeast.  Stressed importance of prompt OB follow-up for continued management. At time of discharge patient nontoxic-appearing in NAD. Patient stable for prompt outpatient follow-up with PCP 1 to 2 days. Patient given strict instructions to return if symptoms worsen. ED Course:   Initial assessment performed.  The patients presenting problems have been discussed, and they are in agreement with the care plan formulated and outlined with them. I have encouraged them to ask questions as they arise throughout their visit. Disposition:  2:10 PM  Discussed lab results with pt along with dx and treatment plan. Discussed importance of PCP follow up. All questions answered. Pt voiced they understood. Return if sx worsen. PLAN:  1. Discharge Medication List as of 5/27/2021  1:50 PM      START taking these medications    Details   prenatal multivit-ca-min-fe-fa (Prenatal Vitamin) tab Take 1 Tablet by mouth daily. , Print, Disp-30 Tablet, R-0      metroNIDAZOLE (FlagyL) 500 mg tablet Take 1 Tablet by mouth two (2) times a day for 7 days. , Normal, Disp-14 Tablet, R-0      ondansetron (Zofran ODT) 4 mg disintegrating tablet 1 Tablet by SubLINGual route every eight (8) hours as needed for Nausea or Vomiting., Normal, Disp-10 Tablet, R-0           2. Follow-up Information     Follow up With Specialties Details Why [address] [facility]  Schedule an appointment as soon as possible for a visit in 1 day  Jose Hong [address]  [address]  [phone]    [facility] Department of OB/GYN  Schedule an appointment as soon as possible for a visit   [place] [address], [address] [facility] [address] [facility] [address] [facility]    Lillian Horn MD Obstetrics & Gynecology, Gynecology, Obstetrics Schedule an appointment as soon as possible for a visit in 1 day  [address] [facility] [address] [phone]      [facility] EMERGENCY DEPT Emergency Medicine  As needed, If symptoms worsen [address] [facility]  [phone]        Return to ED if worse     Diagnosis     Clinical Impression:   1. Pregnancy, unspecified gestational age    3. BV (bacterial vaginosis)    3. Yeast vaginitis        Attestations:    HESHAM Sandhu    Please note that this dictation was completed with [facility], the [employer] voice recognition software.   Quite often unanticipated grammatical, syntax, homophones, and other interpretive errors are inadvertently transcribed by the computer software. Please disregard these errors. Please excuse any errors that have escaped final proofreading. Thank you.

## 2021-05-27 NOTE — ED NOTES
Instructed patient on the need for a urine specimen. Patient also instructed on the proper procedure for obtaining vaginal specimen. Patient verbalized understanding. Specimen containers provided.

## 2021-05-27 NOTE — ED TRIAGE NOTES
The patient presents for evaluation of abdominal pain, nausea, and vomiting x \"a few days. \"  LMP April 12, 202.

## 2021-06-01 ENCOUNTER — HOSPITAL ENCOUNTER (EMERGENCY)
Age: 21
Discharge: HOME | End: 2021-06-01
Attending: EMERGENCY MEDICINE

## 2021-06-01 ENCOUNTER — HOSPITAL ENCOUNTER (EMERGENCY)
Dept: ULTRASOUND IMAGING | Age: 21
Discharge: HOME | End: 2021-06-01
Attending: PHYSICIAN ASSISTANT

## 2021-06-01 VITALS
BODY MASS INDEX: 31.82 KG/M2 | TEMPERATURE: 98.8 F | WEIGHT: 198 LBS | OXYGEN SATURATION: 100 % | HEART RATE: 77 BPM | HEIGHT: 66 IN | DIASTOLIC BLOOD PRESSURE: 52 MMHG | RESPIRATION RATE: 16 BRPM | SYSTOLIC BLOOD PRESSURE: 119 MMHG

## 2021-06-01 DIAGNOSIS — R10.9 ABDOMINAL PAIN DURING PREGNANCY IN FIRST TRIMESTER: Primary | ICD-10-CM

## 2021-06-01 DIAGNOSIS — O26.891 ABDOMINAL PAIN DURING PREGNANCY IN FIRST TRIMESTER: Primary | ICD-10-CM

## 2021-06-01 DIAGNOSIS — O23.41 URINARY TRACT INFECTION IN MOTHER DURING FIRST TRIMESTER OF PREGNANCY: ICD-10-CM

## 2021-06-01 DIAGNOSIS — B37.31 YEAST VAGINITIS: ICD-10-CM

## 2021-06-01 LAB
ALBUMIN SERPL-MCNC: 3.7 G/DL (ref 3.4–5)
ALBUMIN/GLOB SERPL: 0.9 {RATIO} (ref 0.8–1.7)
ALP SERPL-CCNC: 51 U/L (ref 45–117)
ALT SERPL-CCNC: 21 U/L (ref 13–56)
ANION GAP SERPL CALC-SCNC: 6 MMOL/L (ref 3–18)
APPEARANCE UR: ABNORMAL
AST SERPL-CCNC: 14 U/L (ref 10–38)
BASOPHILS # BLD: 0 K/UL (ref 0–0.1)
BASOPHILS NFR BLD: 0 % (ref 0–2)
BILIRUB SERPL-MCNC: 0.3 MG/DL (ref 0.2–1)
BILIRUB UR QL: NEGATIVE
BUN SERPL-MCNC: 8 MG/DL (ref 7–18)
BUN/CREAT SERPL: 13 (ref 12–20)
CALCIUM SERPL-MCNC: 8.6 MG/DL (ref 8.5–10.1)
CHLORIDE SERPL-SCNC: 104 MMOL/L (ref 100–111)
CO2 SERPL-SCNC: 26 MMOL/L (ref 21–32)
COLOR UR: YELLOW
CREAT SERPL-MCNC: 0.64 MG/DL (ref 0.6–1.3)
DIFFERENTIAL METHOD BLD: NORMAL
EOSINOPHIL # BLD: 0.1 K/UL (ref 0–0.4)
EOSINOPHIL NFR BLD: 1 % (ref 0–5)
EPITH CASTS URNS QL MICRO: NORMAL /LPF (ref 0–5)
ERYTHROCYTE [DISTWIDTH] IN BLOOD BY AUTOMATED COUNT: 12.5 % (ref 11.6–14.5)
GLOBULIN SER CALC-MCNC: 3.9 G/DL (ref 2–4)
GLUCOSE SERPL-MCNC: 98 MG/DL (ref 74–99)
GLUCOSE UR STRIP.AUTO-MCNC: NEGATIVE MG/DL
HCG SERPL-ACNC: ABNORMAL MIU/ML (ref 0–10)
HCT VFR BLD AUTO: 40.4 % (ref 35–45)
HGB BLD-MCNC: 13.9 G/DL (ref 12–16)
HGB UR QL STRIP: NEGATIVE
KETONES UR QL STRIP.AUTO: ABNORMAL MG/DL
LEUKOCYTE ESTERASE UR QL STRIP.AUTO: ABNORMAL
LIPASE SERPL-CCNC: 85 U/L (ref 73–393)
LYMPHOCYTES # BLD: 2.3 K/UL (ref 0.9–3.6)
LYMPHOCYTES NFR BLD: 23 % (ref 21–52)
MCH RBC QN AUTO: 31.6 PG (ref 24–34)
MCHC RBC AUTO-ENTMCNC: 34.4 G/DL (ref 31–37)
MCV RBC AUTO: 91.8 FL (ref 74–97)
MONOCYTES # BLD: 0.6 K/UL (ref 0.05–1.2)
MONOCYTES NFR BLD: 6 % (ref 3–10)
NEUTS SEG # BLD: 7.1 K/UL (ref 1.8–8)
NEUTS SEG NFR BLD: 70 % (ref 40–73)
NITRITE UR QL STRIP.AUTO: NEGATIVE
PH UR STRIP: 7.5 [PH] (ref 5–8)
PLATELET # BLD AUTO: 375 K/UL (ref 135–420)
PMV BLD AUTO: 10 FL (ref 9.2–11.8)
POTASSIUM SERPL-SCNC: 3.7 MMOL/L (ref 3.5–5.5)
PROT SERPL-MCNC: 7.6 G/DL (ref 6.4–8.2)
PROT UR STRIP-MCNC: ABNORMAL MG/DL
RBC # BLD AUTO: 4.4 M/UL (ref 4.2–5.3)
SERVICE CMNT-IMP: NORMAL
SODIUM SERPL-SCNC: 136 MMOL/L (ref 136–145)
SP GR UR REFRACTOMETRY: >1.03 (ref 1–1.03)
UROBILINOGEN UR QL STRIP.AUTO: 1 EU/DL (ref 0.2–1)
WBC # BLD AUTO: 10.2 K/UL (ref 4.6–13.2)
WBC URNS QL MICRO: NORMAL /HPF (ref 0–4)
WET PREP GENITAL: NORMAL

## 2021-06-01 PROCEDURE — 99283 EMERGENCY DEPT VISIT LOW MDM: CPT

## 2021-06-01 PROCEDURE — 85025 COMPLETE CBC W/AUTO DIFF WBC: CPT

## 2021-06-01 PROCEDURE — 74011250637 HC RX REV CODE- 250/637: Performed by: PHYSICIAN ASSISTANT

## 2021-06-01 PROCEDURE — 87086 URINE CULTURE/COLONY COUNT: CPT

## 2021-06-01 PROCEDURE — 81001 URINALYSIS AUTO W/SCOPE: CPT

## 2021-06-01 PROCEDURE — 83690 ASSAY OF LIPASE: CPT

## 2021-06-01 PROCEDURE — 93975 VASCULAR STUDY: CPT

## 2021-06-01 PROCEDURE — 87210 SMEAR WET MOUNT SALINE/INK: CPT

## 2021-06-01 PROCEDURE — 80053 COMPREHEN METABOLIC PANEL: CPT

## 2021-06-01 PROCEDURE — 84702 CHORIONIC GONADOTROPIN TEST: CPT

## 2021-06-01 RX ORDER — ACETAMINOPHEN 325 MG/1
650 TABLET ORAL
Status: COMPLETED | OUTPATIENT
Start: 2021-06-01 | End: 2021-06-01

## 2021-06-01 RX ORDER — ASPIRIN 325 MG
1 TABLET, DELAYED RELEASE (ENTERIC COATED) ORAL
Qty: 7 APPLICATOR | Refills: 0 | Status: SHIPPED | OUTPATIENT
Start: 2021-06-01 | End: 2021-06-08

## 2021-06-01 RX ORDER — NITROFURANTOIN 25; 75 MG/1; MG/1
100 CAPSULE ORAL 2 TIMES DAILY
Qty: 6 CAPSULE | Refills: 0 | Status: SHIPPED | OUTPATIENT
Start: 2021-06-01 | End: 2021-06-04

## 2021-06-01 RX ADMIN — ACETAMINOPHEN 650 MG: 325 TABLET ORAL at 21:07

## 2021-06-02 NOTE — ED PROVIDER NOTES
EMERGENCY DEPARTMENT HISTORY AND PHYSICAL EXAM      Date: 6/1/2021  Patient Name: Mary Wall    History of Presenting Illness     Chief Complaint   Patient presents with    Vomiting       History Provided By: Patient    HPI: Mary Wall, 23 y.o. female PMHx significant for asthma presents ambulatory to the ED with cc of lower abdominal cramping x1 week, worsening over the past day. Patient also reports change in vaginal discharge. Patient was recently treated for BV and yeast vaginitis, but states she has been vomiting after taking medication. Denies dysuria, hematuria, change in vaginal discharge. Patient treated with Zofran but states this has not been helping. Denies diarrhea, fever/chills. Patient unsure estimated due date. LMP 4/12  Patient has not followed up with OB. Denies vaginal bleeding. This is patient's first pregnancy. There are no other complaints, changes, or physical findings at this time. PCP: None    No current facility-administered medications on file prior to encounter. Current Outpatient Medications on File Prior to Encounter   Medication Sig Dispense Refill    prenatal multivit-ca-min-fe-fa (Prenatal Vitamin) tab Take 1 Tablet by mouth daily. 30 Tablet 0    ondansetron (Zofran ODT) 4 mg disintegrating tablet 1 Tablet by SubLINGual route every eight (8) hours as needed for Nausea or Vomiting. 10 Tablet 0    [DISCONTINUED] metroNIDAZOLE (FlagyL) 500 mg tablet Take 1 Tablet by mouth two (2) times a day for 7 days. 14 Tablet 0    [DISCONTINUED] clotrimazole (MYCELEX) 1 % vaginal cream Insert 1 Applicator into vagina nightly for 7 days. 7 Applicator 0       Past History     Past Medical History:  Past Medical History:   Diagnosis Date    Asthma        Past Surgical History:  History reviewed. No pertinent surgical history. Family History:  History reviewed. No pertinent family history.     Social History:  Social History     Tobacco Use    Smoking status: Never Smoker  Smokeless tobacco: Never Used   Substance Use Topics    Alcohol use: No    Drug use: No       Allergies:  No Known Allergies      Review of Systems   Review of Systems   Constitutional: Negative for chills and fever. Respiratory: Negative for shortness of breath. Cardiovascular: Negative for chest pain. Gastrointestinal: Positive for abdominal pain and vomiting. Negative for nausea. Genitourinary: Negative for flank pain. Musculoskeletal: Negative for back pain and myalgias. Skin: Negative for color change, pallor, rash and wound. Neurological: Negative for dizziness, weakness and light-headedness. All other systems reviewed and are negative. Physical Exam   Physical Exam  Vitals and nursing note reviewed. Constitutional:       General: She is not in acute distress. Appearance: She is well-developed. Comments: Patient in NAD   HENT:      Head: Normocephalic and atraumatic. Eyes:      Conjunctiva/sclera: Conjunctivae normal.   Cardiovascular:      Rate and Rhythm: Normal rate and regular rhythm. Heart sounds: Normal heart sounds. Pulmonary:      Effort: Pulmonary effort is normal. No respiratory distress. Breath sounds: Normal breath sounds. Abdominal:      General: Bowel sounds are normal. There is no distension. Palpations: Abdomen is soft. Tenderness: There is abdominal tenderness in the suprapubic area. Comments: Abdomen soft, nondistended  No guarding or rigidity   Musculoskeletal:         General: Normal range of motion. Skin:     General: Skin is warm. Findings: No rash. Neurological:      Mental Status: She is alert and oriented to person, place, and time.    Psychiatric:         Behavior: Behavior normal.         Diagnostic Study Results     Labs -     Recent Results (from the past 12 hour(s))   URINALYSIS W/ RFLX MICROSCOPIC    Collection Time: 06/01/21  5:16 PM   Result Value Ref Range    Color YELLOW      Appearance CLOUDY Specific gravity >1.030 (H) 1.005 - 1.030    pH (UA) 7.5 5.0 - 8.0      Protein TRACE (A) NEG mg/dL    Glucose Negative NEG mg/dL    Ketone TRACE (A) NEG mg/dL    Bilirubin Negative NEG      Blood Negative NEG      Urobilinogen 1.0 0.2 - 1.0 EU/dL    Nitrites Negative NEG      Leukocyte Esterase MODERATE (A) NEG     URINE MICROSCOPIC ONLY    Collection Time: 06/01/21  5:16 PM   Result Value Ref Range    WBC 4 to 10 0 - 4 /hpf    Epithelial cells 1+ 0 - 5 /lpf   WET PREP    Collection Time: 06/01/21  5:20 PM    Specimen: Vagina   Result Value Ref Range    Special Requests: NO SPECIAL REQUESTS      Wet prep NO TRICHOMONAS SEEN      Wet prep YEAST  FEW        Wet prep CLUE CELLS ABSENT     CBC WITH AUTOMATED DIFF    Collection Time: 06/01/21  6:00 PM   Result Value Ref Range    WBC 10.2 4.6 - 13.2 K/uL    RBC 4.40 4.20 - 5.30 M/uL    HGB 13.9 12.0 - 16.0 g/dL    HCT 40.4 35.0 - 45.0 %    MCV 91.8 74.0 - 97.0 FL    MCH 31.6 24.0 - 34.0 PG    MCHC 34.4 31.0 - 37.0 g/dL    RDW 12.5 11.6 - 14.5 %    PLATELET 175 075 - 784 K/uL    MPV 10.0 9.2 - 11.8 FL    NEUTROPHILS 70 40 - 73 %    LYMPHOCYTES 23 21 - 52 %    MONOCYTES 6 3 - 10 %    EOSINOPHILS 1 0 - 5 %    BASOPHILS 0 0 - 2 %    ABS. NEUTROPHILS 7.1 1.8 - 8.0 K/UL    ABS. LYMPHOCYTES 2.3 0.9 - 3.6 K/UL    ABS. MONOCYTES 0.6 0.05 - 1.2 K/UL    ABS. EOSINOPHILS 0.1 0.0 - 0.4 K/UL    ABS.  BASOPHILS 0.0 0.0 - 0.1 K/UL    DF AUTOMATED     METABOLIC PANEL, COMPREHENSIVE    Collection Time: 06/01/21  6:00 PM   Result Value Ref Range    Sodium 136 136 - 145 mmol/L    Potassium 3.7 3.5 - 5.5 mmol/L    Chloride 104 100 - 111 mmol/L    CO2 26 21 - 32 mmol/L    Anion gap 6 3.0 - 18 mmol/L    Glucose 98 74 - 99 mg/dL    BUN 8 7.0 - 18 MG/DL    Creatinine 0.64 0.6 - 1.3 MG/DL    BUN/Creatinine ratio 13 12 - 20      GFR est AA >60 >60 ml/min/1.73m2    GFR est non-AA >60 >60 ml/min/1.73m2    Calcium 8.6 8.5 - 10.1 MG/DL    Bilirubin, total 0.3 0.2 - 1.0 MG/DL    ALT (SGPT) 21 13 - 56 U/L    AST (SGOT) 14 10 - 38 U/L    Alk. phosphatase 51 45 - 117 U/L    Protein, total 7.6 6.4 - 8.2 g/dL    Albumin 3.7 3.4 - 5.0 g/dL    Globulin 3.9 2.0 - 4.0 g/dL    A-G Ratio 0.9 0.8 - 1.7     BETA HCG, QT    Collection Time: 06/01/21  6:00 PM   Result Value Ref Range    Beta HCG, QT 58,186 (H) 0 - 10 MIU/ML   LIPASE    Collection Time: 06/01/21  6:00 PM   Result Value Ref Range    Lipase 85 73 - 393 U/L       Radiologic Studies -   US OB TV W DOPPLER   Final Result      Single intrauterine gestational sac with estimated gestational age of 6 weeks   and 4 days. No fetal pole or fetal heart rate identified. Correlate with trending hCG values and follow-up pelvic ultrasound as warranted. CT Results  (Last 48 hours)    None        CXR Results  (Last 48 hours)    None          Medical Decision Making   I am the first provider for this patient. I reviewed the vital signs, available nursing notes, past medical history, past surgical history, family history and social history. Vital Signs-Reviewed the patient's vital signs. Patient Vitals for the past 12 hrs:   Temp Pulse Resp BP SpO2   06/01/21 1637 99.4 °F (37.4 °C) 79 16 133/63 100 %       Records Reviewed: Nursing Notes and Old Medical Records    Provider Notes (Medical Decision Making):   DDX: Pregnancy, dehydration, electrolyte abnormality, UTI, BV, yeast, trichomonas, gonorrhea, chlamydia, pancreatitis, Ectopic pregnancy    24 yo F presents with lower abdominal cramping worsening over the past few days with associated vomiting. Recently treated for BV and yeast but states she has not been able to tolerate medications. On exam mild suprapubic tenderness. UA shows WBC in urine. Urine culture sent will treat with Macrobid since patient was unable to tolerate Keflex previously. Wet prep shows yeast.  Will treat with clotrimazole. US shows IUP and beta quant is consistent with LMP. No vaginal bleeding or RhoGam necessary.   Discussed with patient importance of continued OB follow-up and management. At time of discharge patient nontoxic-appearing in NAD. Pt stable for prompt outpatient follow-up with PCP 1 to 2 days. Patient given strict instructions to return if symptoms worsen. ED Course:   Initial assessment performed. The patients presenting problems have been discussed, and they are in agreement with the care plan formulated and outlined with them. I have encouraged them to ask questions as they arise throughout their visit. Pt declined pelvic exam.    Disposition:  8:29 PM  Discussed lab and imaging results with pt along with dx and treatment plan. Discussed importance of PCP follow up. All questions answered. Pt voiced they understood. Return if sx worsen. PLAN:  1. Current Discharge Medication List      START taking these medications    Details   nitrofurantoin, macrocrystal-monohydrate, (Macrobid) 100 mg capsule Take 1 Capsule by mouth two (2) times a day for 3 days. Qty: 6 Capsule, Refills: 0  Start date: 2021, End date: 2021         CONTINUE these medications which have CHANGED    Details   clotrimazole (MYCELEX) 1 % vaginal cream Insert 1 Applicator into vagina nightly for 7 days.   Qty: 7 Applicator, Refills: 0  Start date: 2021, End date: 2021         STOP taking these medications       metroNIDAZOLE (FlagyL) 500 mg tablet Comments:   Reason for Stoppin.   Follow-up Information     Follow up With Specialties Details Why [address]  Schedule an appointment as soon as possible for a visit in 1 day  Jose Hong 3  [facility] [address] [place] [address] Sekou White MD Obstetrics & Gynecology, Gynecology, Obstetrics Schedule an appointment as soon as possible for a visit in 1 day  [address] [place] [address] [facility] [address] [facility] Department  Schedule an appointment as soon as possible for a visit in 1 day  [address] [facility] [address]     [address] [facility] EMERGENCY DEPT Emergency Medicine  As needed, If symptoms worsen [address] [facility]  [phone]        Return to ED if worse     Diagnosis     Clinical Impression:   1. Abdominal pain during pregnancy in first trimester    2. Yeast vaginitis    3. Urinary tract infection in mother during first trimester of pregnancy        Attestations:    HESHAM Fernández    Please note that this dictation was completed with [employer], the computer voice recognition software. Quite often unanticipated grammatical, syntax, homophones, and other interpretive errors are inadvertently transcribed by the computer software. Please disregard these errors. Please excuse any errors that have escaped final proofreading. Thank you.

## 2021-06-03 LAB
BACTERIA SPEC CULT: NORMAL
CC UR VC: NORMAL
SERVICE CMNT-IMP: NORMAL

## 2021-07-02 ENCOUNTER — HOSPITAL ENCOUNTER (EMERGENCY)
Age: 21
Discharge: HOME | End: 2021-07-02
Attending: EMERGENCY MEDICINE

## 2021-07-02 VITALS
HEIGHT: 66 IN | HEART RATE: 86 BPM | WEIGHT: 197 LBS | OXYGEN SATURATION: 99 % | RESPIRATION RATE: 16 BRPM | SYSTOLIC BLOOD PRESSURE: 114 MMHG | BODY MASS INDEX: 31.66 KG/M2 | DIASTOLIC BLOOD PRESSURE: 76 MMHG | TEMPERATURE: 99.4 F

## 2021-07-02 DIAGNOSIS — J02.9 SORE THROAT: Primary | ICD-10-CM

## 2021-07-02 LAB — DEPRECATED S PYO AG THROAT QL EIA: NEGATIVE

## 2021-07-02 PROCEDURE — 99282 EMERGENCY DEPT VISIT SF MDM: CPT

## 2021-07-02 PROCEDURE — 87070 CULTURE OTHR SPECIMN AEROBIC: CPT

## 2021-07-02 PROCEDURE — 87880 STREP A ASSAY W/OPTIC: CPT

## 2021-07-02 RX ORDER — IBUPROFEN 600 MG/1
600 TABLET ORAL
Qty: 20 TABLET | Refills: 0 | Status: SHIPPED | OUTPATIENT
Start: 2021-07-02 | End: 2021-12-28

## 2021-07-02 RX ORDER — LIDOCAINE HYDROCHLORIDE 20 MG/ML
SOLUTION OROPHARYNGEAL
Qty: 200 ML | Refills: 0 | Status: SHIPPED | OUTPATIENT
Start: 2021-07-02 | End: 2021-12-28

## 2021-07-02 NOTE — ED PROVIDER NOTES
EMERGENCY DEPARTMENT HISTORY AND PHYSICAL EXAM    3:11 PM      Date: 7/2/2021  Patient Name: Wendy Hewitt    History of Presenting Illness     Chief Complaint   Patient presents with    Sore Throat    Ear Pain    Headache         History Provided By: Patient    Additional History (Context): Wendy Hewitt is a 23 y.o. female with asthma who presents with complaint of sore throat and associated headache x 2 days. Patient denies fever or chills, throat swelling, cough, nausea or vomiting. Denies sick contacts, known exposure to Covid. Did not receive Covid vaccine. Did not take any medication for the symptoms prior to arrival.    PCP: None    Current Outpatient Medications   Medication Sig Dispense Refill    lidocaine (Lidocaine Viscous) 2 % solution Put 10 mL in mouth and swish and spit out QAC and at bedtime 200 mL 0    ibuprofen (MOTRIN) 600 mg tablet Take 1 Tablet by mouth every six (6) hours as needed for Pain. 20 Tablet 0       Past History     Past Medical History:  Past Medical History:   Diagnosis Date    Asthma     Miscarriage        Past Surgical History:  No past surgical history on file. Family History:  No family history on file. Social History:  Social History     Tobacco Use    Smoking status: Never Smoker    Smokeless tobacco: Never Used   Substance Use Topics    Alcohol use: No    Drug use: No       Allergies:  No Known Allergies      Review of Systems       Review of Systems   Constitutional: Negative for chills and fever. HENT: Positive for sore throat. Respiratory: Negative for shortness of breath. Cardiovascular: Negative for chest pain. Gastrointestinal: Negative for abdominal pain, nausea and vomiting. Skin: Negative for rash. Neurological: Positive for headaches. Negative for weakness. All other systems reviewed and are negative.         Physical Exam     Visit Vitals  /76 (BP 1 Location: Left upper arm, BP Patient Position: At rest)   Pulse 86   Temp 99.4 °F (37.4 °C)   Resp 16   Ht 5' 6\" (1.676 m)   Wt 89.4 kg (197 lb)   LMP 04/12/2021   SpO2 99%   Breastfeeding No   BMI 31.80 kg/m²         Physical Exam  Vitals and nursing note reviewed. Constitutional:       General: She is not in acute distress. Appearance: Normal appearance. She is well-developed. She is not ill-appearing, toxic-appearing or diaphoretic. HENT:      Head: Normocephalic and atraumatic. Right Ear: Tympanic membrane and ear canal normal.      Left Ear: Tympanic membrane and ear canal normal.      Nose: No congestion or rhinorrhea. Mouth/Throat:      Mouth: Mucous membranes are moist.      Pharynx: Posterior oropharyngeal erythema present. No oropharyngeal exudate. Tonsils: No tonsillar exudate or tonsillar abscesses. Cardiovascular:      Rate and Rhythm: Normal rate and regular rhythm. Heart sounds: Normal heart sounds. No murmur heard. No friction rub. No gallop. Pulmonary:      Effort: Pulmonary effort is normal. No respiratory distress. Breath sounds: Normal breath sounds. No wheezing or rales. Musculoskeletal:         General: Normal range of motion. Cervical back: Normal range of motion and neck supple. No rigidity. Lymphadenopathy:      Cervical: No cervical adenopathy. Skin:     General: Skin is warm. Findings: No rash. Neurological:      Mental Status: She is alert. Diagnostic Study Results     Labs -  Recent Results (from the past 12 hour(s))   STREP AG SCREEN, GROUP A    Collection Time: 07/02/21  3:00 PM    Specimen: Throat   Result Value Ref Range    Group A Strep Ag ID Negative         Radiologic Studies -   No orders to display         Medical Decision Making   I am the first provider for this patient. I reviewed the vital signs, available nursing notes, past medical history, past surgical history, family history and social history. Vital Signs-Reviewed the patient's vital signs.     Records Reviewed: Nursing Notes and Old Medical Records (Time of Review: 3:11 PM)    ED Course: Progress Notes, Reevaluation, and Consults:  3:58 PM  Reviewed results with patient. Discussed need for close outpatient follow-up this week for reassessment. Discussed strict return precautions, including fever, throat swelling, or any other medical concerns. Provider Notes (Medical Decision Making): 79-year-old female who presents the ED due to sore throat and associated headache. Afebrile, nontoxic-appearing, looks well. No evidence of otitis media/externa, strep pharyngitis. No nuchal rigidity or rash. Stable for discharge with symptomatic management, close outpatient follow-up, and strict return precautions for any new or worsening symptoms. Diagnosis     Clinical Impression:   1. Sore throat        Disposition: home     Follow-up Information     Follow up With Specialties Details Why [facility] EMERGENCY DEPT Emergency Medicine  If symptoms worsen [address]  Schedule an appointment as soon as possible for a visit   [address]  [phone]           Patient's Medications   Start Taking    IBUPROFEN (MOTRIN) 600 MG TABLET    Take 1 Tablet by mouth every six (6) hours as needed for Pain. LIDOCAINE (LIDOCAINE VISCOUS) 2 % SOLUTION    Put 10 mL in mouth and swish and spit out QAC and at bedtime   Continue Taking    No medications on file   These Medications have changed    No medications on file   Stop Taking    ONDANSETRON (ZOFRAN ODT) 4 MG DISINTEGRATING TABLET    1 Tablet by SubLINGual route every eight (8) hours as needed for Nausea or Vomiting. PRENATAL MULTIVIT-CA-MIN-FE-FA (PRENATAL VITAMIN) TAB    Take 1 Tablet by mouth daily. Dictation disclaimer:  Please note that this dictation was completed with [employer]ify, the [employer] voice recognition software.   Quite often unanticipated grammatical, syntax, homophones, and other interpretive errors are inadvertently transcribed by the computer software. Please disregard these errors. Please excuse any errors that have escaped final proofreading.

## 2021-07-02 NOTE — ROUTINE PROCESS
Luis Foster is a 23 y.o. female that was discharged in stable. Pt was accompanied by self. Pt is driving. The patients diagnosis, condition and treatment were explained to  patient and aftercare instructions were given. The patient verbalized understanding. Patient armband removed and shredded.

## 2021-07-02 NOTE — Clinical Note
[address] [facility] EMERGENCY DEPT  [address] [address] [facility] [address] [address] [phone]    Work/School Note    Date: 7/2/2021    To Whom It May concern:    Maria Fernanda Jones was seen and treated today in the emergency room by the following provider(s):  Attending Provider: Tresa Vickers MD  Physician Assistant: Jensen Joshua. Maria Fernanda Jones is excused from work/school on 07/02/21 and 07/03/21. She is medically clear to return to work/school on 7/4/2021.        Sincerely,          HESHAM Olmedo

## 2021-07-04 LAB
BACTERIA SPEC CULT: NORMAL
SERVICE CMNT-IMP: NORMAL

## 2021-08-09 ENCOUNTER — HOSPITAL ENCOUNTER (EMERGENCY)
Age: 21
Discharge: HOME | End: 2021-08-09
Attending: STUDENT IN AN ORGANIZED HEALTH CARE EDUCATION/TRAINING PROGRAM

## 2021-08-09 VITALS
BODY MASS INDEX: 32.93 KG/M2 | HEART RATE: 70 BPM | OXYGEN SATURATION: 100 % | WEIGHT: 204 LBS | SYSTOLIC BLOOD PRESSURE: 123 MMHG | DIASTOLIC BLOOD PRESSURE: 85 MMHG | RESPIRATION RATE: 16 BRPM | TEMPERATURE: 98.3 F

## 2021-08-09 DIAGNOSIS — R10.84 ABDOMINAL PAIN, GENERALIZED: Primary | ICD-10-CM

## 2021-08-09 LAB
ALBUMIN SERPL-MCNC: 3.9 G/DL (ref 3.4–5)
ALBUMIN/GLOB SERPL: 0.8 {RATIO} (ref 0.8–1.7)
ALP SERPL-CCNC: 58 U/L (ref 45–117)
ALT SERPL-CCNC: 22 U/L (ref 13–56)
ANION GAP SERPL CALC-SCNC: 5 MMOL/L (ref 3–18)
APPEARANCE UR: CLEAR
AST SERPL-CCNC: 28 U/L (ref 10–38)
BASOPHILS # BLD: 0 K/UL (ref 0–0.1)
BASOPHILS NFR BLD: 0 % (ref 0–2)
BILIRUB SERPL-MCNC: 0.5 MG/DL (ref 0.2–1)
BILIRUB UR QL: NEGATIVE
BUN SERPL-MCNC: 8 MG/DL (ref 7–18)
BUN/CREAT SERPL: 11 (ref 12–20)
CALCIUM SERPL-MCNC: 9.4 MG/DL (ref 8.5–10.1)
CHLORIDE SERPL-SCNC: 106 MMOL/L (ref 100–111)
CO2 SERPL-SCNC: 24 MMOL/L (ref 21–32)
COLOR UR: YELLOW
CREAT SERPL-MCNC: 0.76 MG/DL (ref 0.6–1.3)
DIFFERENTIAL METHOD BLD: NORMAL
EOSINOPHIL # BLD: 0 K/UL (ref 0–0.4)
EOSINOPHIL NFR BLD: 0 % (ref 0–5)
ERYTHROCYTE [DISTWIDTH] IN BLOOD BY AUTOMATED COUNT: 12.5 % (ref 11.6–14.5)
GLOBULIN SER CALC-MCNC: 4.9 G/DL (ref 2–4)
GLUCOSE SERPL-MCNC: 80 MG/DL (ref 74–99)
GLUCOSE UR STRIP.AUTO-MCNC: NEGATIVE MG/DL
HCG UR QL: NEGATIVE
HCT VFR BLD AUTO: 43.6 % (ref 35–45)
HGB BLD-MCNC: 14.5 G/DL (ref 12–16)
HGB UR QL STRIP: NEGATIVE
KETONES UR QL STRIP.AUTO: NEGATIVE MG/DL
LEUKOCYTE ESTERASE UR QL STRIP.AUTO: NEGATIVE
LIPASE SERPL-CCNC: 72 U/L (ref 73–393)
LYMPHOCYTES # BLD: 2.5 K/UL (ref 0.9–3.6)
LYMPHOCYTES NFR BLD: 29 % (ref 21–52)
MCH RBC QN AUTO: 30.2 PG (ref 24–34)
MCHC RBC AUTO-ENTMCNC: 33.3 G/DL (ref 31–37)
MCV RBC AUTO: 90.8 FL (ref 74–97)
MONOCYTES # BLD: 0.5 K/UL (ref 0.05–1.2)
MONOCYTES NFR BLD: 6 % (ref 3–10)
NEUTS SEG # BLD: 5.5 K/UL (ref 1.8–8)
NEUTS SEG NFR BLD: 65 % (ref 40–73)
NITRITE UR QL STRIP.AUTO: NEGATIVE
PH UR STRIP: 6 [PH] (ref 5–8)
PLATELET # BLD AUTO: 405 K/UL (ref 135–420)
PMV BLD AUTO: 10.7 FL (ref 9.2–11.8)
POTASSIUM SERPL-SCNC: 4.5 MMOL/L (ref 3.5–5.5)
PROT SERPL-MCNC: 8.8 G/DL (ref 6.4–8.2)
PROT UR STRIP-MCNC: NEGATIVE MG/DL
RBC # BLD AUTO: 4.8 M/UL (ref 4.2–5.3)
SODIUM SERPL-SCNC: 135 MMOL/L (ref 136–145)
SP GR UR REFRACTOMETRY: 1.02 (ref 1–1.03)
UROBILINOGEN UR QL STRIP.AUTO: 0.2 EU/DL (ref 0.2–1)
WBC # BLD AUTO: 8.6 K/UL (ref 4.6–13.2)

## 2021-08-09 PROCEDURE — 83690 ASSAY OF LIPASE: CPT

## 2021-08-09 PROCEDURE — 81003 URINALYSIS AUTO W/O SCOPE: CPT

## 2021-08-09 PROCEDURE — 74011250637 HC RX REV CODE- 250/637: Performed by: STUDENT IN AN ORGANIZED HEALTH CARE EDUCATION/TRAINING PROGRAM

## 2021-08-09 PROCEDURE — 99282 EMERGENCY DEPT VISIT SF MDM: CPT

## 2021-08-09 PROCEDURE — 85025 COMPLETE CBC W/AUTO DIFF WBC: CPT

## 2021-08-09 PROCEDURE — 81025 URINE PREGNANCY TEST: CPT

## 2021-08-09 PROCEDURE — 80053 COMPREHEN METABOLIC PANEL: CPT

## 2021-08-09 RX ORDER — ACETAMINOPHEN 325 MG/1
650 TABLET ORAL
Status: COMPLETED | OUTPATIENT
Start: 2021-08-09 | End: 2021-08-09

## 2021-08-09 RX ADMIN — ACETAMINOPHEN 650 MG: 325 TABLET ORAL at 17:50

## 2021-08-09 NOTE — ED PROVIDER NOTES
EMERGENCY DEPARTMENT HISTORY AND PHYSICAL EXAM    I have evaluated the patient at 5:11 PM      Date: 8/9/2021  Patient Name: Raji Vazquez    History of Presenting Illness     Chief Complaint   Patient presents with    Abdominal Pain         History Provided By: Patient  Location/Duration/Severity/Modifying factors   This is a 69-year-old female past medical history of asthma as well as miscarriage status post D&C in June 2021 presenting to the emergency department for evaluation of abdominal pain. Patient states that has been going on for about 2 weeks now. It is intermittent in nature. She describes a stabbing/cramping abdominal pain. She denies any nausea or vomiting, fevers or chills, diarrhea or constipation. She denies any dysuria, hematuria, vaginal discharge, vaginal bleeding. The pain is located suprapubically. Without radiation. She is attempted ginger ale and drinking water at home without relief. She has not taken any over-the-counter medications for this. PCP: None    Current Outpatient Medications   Medication Sig Dispense Refill    lidocaine (Lidocaine Viscous) 2 % solution Put 10 mL in mouth and swish and spit out QAC and at bedtime 200 mL 0    ibuprofen (MOTRIN) 600 mg tablet Take 1 Tablet by mouth every six (6) hours as needed for Pain. 20 Tablet 0       Past History     Past Medical History:  Past Medical History:   Diagnosis Date    Asthma     Miscarriage        Past Surgical History:  No past surgical history on file. Family History:  No family history on file. Social History:  Social History     Tobacco Use    Smoking status: Never Smoker    Smokeless tobacco: Never Used   Substance Use Topics    Alcohol use: No    Drug use: No       Allergies:  No Known Allergies      Review of Systems       Review of Systems   Constitutional: Negative for activity change, chills, diaphoresis, fatigue and fever.    HENT: Negative for congestion, ear pain, sinus pain and voice change. Eyes: Negative for photophobia, pain and visual disturbance. Respiratory: Negative for cough, chest tightness, shortness of breath, wheezing and stridor. Cardiovascular: Negative for chest pain and palpitations. Gastrointestinal: Positive for abdominal pain. Negative for abdominal distention, constipation, diarrhea, nausea and vomiting. Endocrine: Negative for polydipsia, polyphagia and polyuria. Genitourinary: Negative for difficulty urinating, dysuria and hematuria. Musculoskeletal: Negative for back pain, joint swelling and myalgias. Skin: Negative for rash. Neurological: Negative for dizziness, weakness and headaches. Psychiatric/Behavioral: Negative for agitation. The patient is not nervous/anxious. Physical Exam     Visit Vitals  /85 (BP 1 Location: Left upper arm, BP Patient Position: At rest)   Pulse 70   Temp 98.3 °F (36.8 °C)   Resp 16   Wt 92.5 kg (204 lb)   SpO2 100%   BMI 32.93 kg/m²         Physical Exam  Constitutional:       General: She is not in acute distress. Appearance: She is not toxic-appearing. HENT:      Head: Normocephalic and atraumatic. Mouth/Throat:      Mouth: Mucous membranes are moist.   Eyes:      Extraocular Movements: Extraocular movements intact. Pupils: Pupils are equal, round, and reactive to light. Cardiovascular:      Rate and Rhythm: Normal rate and regular rhythm. Heart sounds: Normal heart sounds. No murmur heard. No friction rub. No gallop. Pulmonary:      Effort: Pulmonary effort is normal.      Breath sounds: Normal breath sounds. Abdominal:      General: There is no distension. Palpations: Abdomen is soft. There is no mass. Tenderness: There is abdominal tenderness. There is no guarding or rebound. Negative signs include Espinoza's sign and McBurney's sign. Hernia: No hernia is present. Comments: Mild tenderness to palpation over the suprapubic region.    Musculoskeletal: General: No swelling, tenderness or deformity. Cervical back: Normal range of motion and neck supple. Skin:     General: Skin is warm and dry. Capillary Refill: Capillary refill takes less than 2 seconds. Findings: No rash. Neurological:      General: No focal deficit present. Mental Status: She is alert and oriented to person, place, and time. Psychiatric:         Mood and Affect: Mood normal.           Diagnostic Study Results     Labs -  Recent Results (from the past 12 hour(s))   CBC WITH AUTOMATED DIFF    Collection Time: 08/09/21  4:33 PM   Result Value Ref Range    WBC 8.6 4.6 - 13.2 K/uL    RBC 4.80 4.20 - 5.30 M/uL    HGB 14.5 12.0 - 16.0 g/dL    HCT 43.6 35.0 - 45.0 %    MCV 90.8 74.0 - 97.0 FL    MCH 30.2 24.0 - 34.0 PG    MCHC 33.3 31.0 - 37.0 g/dL    RDW 12.5 11.6 - 14.5 %    PLATELET 503 273 - 763 K/uL    MPV 10.7 9.2 - 11.8 FL    NEUTROPHILS 65 40 - 73 %    LYMPHOCYTES 29 21 - 52 %    MONOCYTES 6 3 - 10 %    EOSINOPHILS 0 0 - 5 %    BASOPHILS 0 0 - 2 %    ABS. NEUTROPHILS 5.5 1.8 - 8.0 K/UL    ABS. LYMPHOCYTES 2.5 0.9 - 3.6 K/UL    ABS. MONOCYTES 0.5 0.05 - 1.2 K/UL    ABS. EOSINOPHILS 0.0 0.0 - 0.4 K/UL    ABS. BASOPHILS 0.0 0.0 - 0.1 K/UL    DF AUTOMATED     METABOLIC PANEL, COMPREHENSIVE    Collection Time: 08/09/21  4:33 PM   Result Value Ref Range    Sodium 135 (L) 136 - 145 mmol/L    Potassium 4.5 3.5 - 5.5 mmol/L    Chloride 106 100 - 111 mmol/L    CO2 24 21 - 32 mmol/L    Anion gap 5 3.0 - 18 mmol/L    Glucose 80 74 - 99 mg/dL    BUN 8 7.0 - 18 MG/DL    Creatinine 0.76 0.6 - 1.3 MG/DL    BUN/Creatinine ratio 11 (L) 12 - 20      GFR est AA >60 >60 ml/min/1.73m2    GFR est non-AA >60 >60 ml/min/1.73m2    Calcium 9.4 8.5 - 10.1 MG/DL    Bilirubin, total 0.5 0.2 - 1.0 MG/DL    ALT (SGPT) 22 13 - 56 U/L    AST (SGOT) 28 10 - 38 U/L    Alk.  phosphatase 58 45 - 117 U/L    Protein, total 8.8 (H) 6.4 - 8.2 g/dL    Albumin 3.9 3.4 - 5.0 g/dL    Globulin 4.9 (H) 2.0 - 4.0 g/dL A-G Ratio 0.8 0.8 - 1.7     LIPASE    Collection Time: 08/09/21  4:33 PM   Result Value Ref Range    Lipase 72 (L) 73 - 393 U/L   URINALYSIS W/ RFLX MICROSCOPIC    Collection Time: 08/09/21  4:35 PM   Result Value Ref Range    Color YELLOW      Appearance CLEAR      Specific gravity 1.023 1.005 - 1.030      pH (UA) 6.0 5.0 - 8.0      Protein Negative NEG mg/dL    Glucose Negative NEG mg/dL    Ketone Negative NEG mg/dL    Bilirubin Negative NEG      Blood Negative NEG      Urobilinogen 0.2 0.2 - 1.0 EU/dL    Nitrites Negative NEG      Leukocyte Esterase Negative NEG     HCG URINE, QL    Collection Time: 08/09/21  4:35 PM   Result Value Ref Range    HCG urine, QL Negative NEG         Radiologic Studies -   No orders to display         Medical Decision Making   I am the first provider for this patient. I reviewed the vital signs, available nursing notes, past medical history, past surgical history, family history and social history. Vital Signs-Reviewed the patient's vital signs. Records Reviewed: Nursing Notes (Time of Review: 5:11 PM)    ED Course: Progress Notes, Reevaluation, and Consults:         Provider Notes (Medical Decision Making):   MDM  Number of Diagnoses or Management Options  Abdominal pain, generalized  Diagnosis management comments: 76-year-old female presenting to the emergency department for evaluation of intermittent abdominal pain for the past 2 weeks. She appears nontoxic. Vital signs stable. She is afebrile. Abdominal exam is reassuring. Plan on screening lab work, lipase, LFTs, urinalysis, hCG. Will give Tylenol for pain she has not taken anything at home for this. Reassess. 1810:  Patient lab work is largely unremarkable. Pain is improved. No evidence of UTI on UA. Patient will be discharged home to care for symptoms conservatively at this time. She has been encouraged to follow-up with her primary care doctor. She has been given ED return precautions.   All questions have been answered. She verbalized good understanding and agreement with the discharge plan. Diagnosis     Clinical Impression:   1. Abdominal pain, generalized        Disposition: home    Follow-up Information    None          Patient's Medications   Start Taking    No medications on file   Continue Taking    IBUPROFEN (MOTRIN) 600 MG TABLET    Take 1 Tablet by mouth every six (6) hours as needed for Pain. LIDOCAINE (LIDOCAINE VISCOUS) 2 % SOLUTION    Put 10 mL in mouth and swish and spit out QAC and at bedtime   These Medications have changed    No medications on file   Stop Taking    No medications on file     Disclaimer: Sections of this note are dictated using utilizing voice recognition software. Minor typographical errors may be present. If questions arise, please do not hesitate to contact me or call our department.

## 2021-08-09 NOTE — ED TRIAGE NOTES
\"My stomach is hurting. Its been like that for the past several weeks and the patient got worst today. \"

## 2021-09-24 ENCOUNTER — HOSPITAL ENCOUNTER (EMERGENCY)
Age: 21
Discharge: LEFT BEFORE DISCHARGE | End: 2021-09-24

## 2021-09-24 PROCEDURE — 75810000275 HC EMERGENCY DEPT VISIT NO LEVEL OF CARE

## 2021-12-28 ENCOUNTER — HOSPITAL ENCOUNTER (EMERGENCY)
Age: 21
Discharge: HOME | End: 2021-12-28
Attending: EMERGENCY MEDICINE

## 2021-12-28 VITALS
TEMPERATURE: 99 F | HEART RATE: 87 BPM | SYSTOLIC BLOOD PRESSURE: 102 MMHG | DIASTOLIC BLOOD PRESSURE: 73 MMHG | HEIGHT: 66 IN | RESPIRATION RATE: 16 BRPM | OXYGEN SATURATION: 100 % | BODY MASS INDEX: 30.53 KG/M2 | WEIGHT: 190 LBS

## 2021-12-28 DIAGNOSIS — Z11.3 SCREENING EXAMINATION FOR STD (SEXUALLY TRANSMITTED DISEASE): ICD-10-CM

## 2021-12-28 DIAGNOSIS — Z20.822 SUSPECTED 2019 NOVEL CORONAVIRUS INFECTION: Primary | ICD-10-CM

## 2021-12-28 LAB
SERVICE CMNT-IMP: NORMAL
WET PREP GENITAL: NORMAL

## 2021-12-28 PROCEDURE — U0003 INFECTIOUS AGENT DETECTION BY NUCLEIC ACID (DNA OR RNA); SEVERE ACUTE RESPIRATORY SYNDROME CORONAVIRUS 2 (SARS-COV-2) (CORONAVIRUS DISEASE [COVID-19]), AMPLIFIED PROBE TECHNIQUE, MAKING USE OF HIGH THROUGHPUT TECHNOLOGIES AS DESCRIBED BY CMS-2020-01-R: HCPCS

## 2021-12-28 PROCEDURE — 99281 EMR DPT VST MAYX REQ PHY/QHP: CPT

## 2021-12-28 PROCEDURE — 87491 CHLMYD TRACH DNA AMP PROBE: CPT

## 2021-12-28 PROCEDURE — 87210 SMEAR WET MOUNT SALINE/INK: CPT

## 2021-12-28 RX ORDER — METRONIDAZOLE 7.5 MG/G
1 GEL VAGINAL 2 TIMES DAILY
Qty: 50 G | Refills: 0 | Status: SHIPPED | OUTPATIENT
Start: 2021-12-28 | End: 2022-01-02

## 2021-12-28 NOTE — Clinical Note
[facility] EMERGENCY DEPT  [id number] [address] [phone]    Work/School Note    Date: 12/28/2021     To Whom It May concern:    Arlene Ramirez was evaluated by the following provider(s):  No providers found. COVID19 virus is suspected. Per the CDC guidelines we recommend home isolation until the following conditions are all met:    1. At least 10 days have passed since symptoms first appeared and  2. At least 24 hours have passed since last fever without the use of fever-reducing medications and  3.  Symptoms (e.g., cough, shortness of breath) have improved      Sincerely,          Samantha Philip MD

## 2021-12-28 NOTE — ED PROVIDER NOTES
EMERGENCY DEPARTMENT HISTORY AND PHYSICAL EXAM    2:48 PM patient seen at this time in the [facility]      Date: (Not on file)  Patient Name: Sahrmaine Orr    History of Presenting Illness     No chief complaint on file. History Provided By: patient    Additional History (Context): Shamraine Orr is a 24 y.o. female presents with 1) lost sense of taste and smell ongoing for about 10 days, vague upset stomach no diarrhea or vomiting at this point. Had some body aches but well treated with Tylenol. Currently only has the symptoms of loss of taste and smell. 2) wants to be checked for STDs states genitalia is a bit swollen. No foul odor increased discharge and itching or pain. She did go to the health department but they said they were testing tomorrow and she does not want to be exposed to other people. [facility] PCP: None    Chief Complaint:   Duration:    Timing:    Location:   Quality:   Severity:   Modifying Factors:   Associated Symptoms:       Current Outpatient Medications   Medication Sig Dispense Refill    lidocaine (Lidocaine Viscous) 2 % solution Put 10 mL in mouth and swish and spit out QAC and at bedtime 200 mL 0    ibuprofen (MOTRIN) 600 mg tablet Take 1 Tablet by mouth every six (6) hours as needed for Pain. 20 Tablet 0       Past History     Past Medical History:  Past Medical History:   Diagnosis Date    Asthma     Miscarriage        Past Surgical History:  No past surgical history on file. Family History:  No family history on file. Social History:  Social History     Tobacco Use    Smoking status: Never Smoker    Smokeless tobacco: Never Used   Substance Use Topics    Alcohol use: No    Drug use: No       Allergies:  No Known Allergies      Review of Systems     Review of Systems   Constitutional: Negative for diaphoresis and fever. HENT: Negative for congestion and sore throat. Eyes: Negative for pain and itching. Respiratory: Negative for cough and shortness of breath. Cardiovascular: Negative for chest pain and palpitations. Gastrointestinal: Negative for abdominal pain and diarrhea. Endocrine: Negative for polydipsia and polyuria. Genitourinary: Negative for dysuria and hematuria. Musculoskeletal: Positive for myalgias. Negative for arthralgias. Skin: Negative for rash and wound. Neurological: Negative for seizures and syncope. Hematological: Does not bruise/bleed easily. Psychiatric/Behavioral: Negative for agitation and hallucinations. Physical Exam       No data found. IPVITALS  No data found. Physical Exam  Vitals and nursing note reviewed. Constitutional:       General: She is not in acute distress. Appearance: She is well-developed. She is not ill-appearing. Comments: Ambulatory unassisted undisturbed gait   HENT:      Head: Normocephalic and atraumatic. Eyes:      General: No scleral icterus. Conjunctiva/sclera: Conjunctivae normal.   Neck:      Vascular: No JVD. Cardiovascular:      Rate and Rhythm: Normal rate and regular rhythm. Pulmonary:      Effort: Pulmonary effort is normal. No respiratory distress. Musculoskeletal:         General: Normal range of motion. Cervical back: Normal range of motion and neck supple. Skin:     General: Skin is warm and dry. Neurological:      Mental Status: She is alert. Psychiatric:         Thought Content: Thought content normal.         Judgment: Judgment normal.           Diagnostic Study Results   Labs -  No results found for this or any previous visit (from the past 12 hour(s)). Radiologic Studies -   No orders to display     No results found. Medications ordered:   Medications - No data to display      Medical Decision Making   Initial Medical Decision Making and DDx: We will send PCR coronavirus test discussed with her son on the MyChart will alert her for results, coronavirus work note. For STD screening do not suspect tubo-ovarian abscess or PID.   Will get self swab and discussed results here referred to health department for any concern for HIV or hepatitis. ED Course: Progress Notes, Reevaluation, and Consults:     6:49 PM patient needed to leave prior to results, called the patient informed of bacterial vaginosis sent MetroGel to her pharmacy. Advised GC chlamydia will return in 48 hours to check on MyChart as well as coronavirus. I am the first provider for this patient. I reviewed the vital signs, available nursing notes, past medical history, past surgical history, family history and social history. Patient Vitals for the past 12 hrs:   Temp Pulse Resp BP SpO2   12/28/21 1454 99 °F (37.2 °C) 87 16 102/73 100 %       Vital Signs-Reviewed the patient's vital signs. Pulse Oximetry Analysis, Cardiac Monitor, 12 lead ekg:      Interpreted by the EP. Records Reviewed: Nursing notes reviewed (Time of Review: 2:48 PM)    Procedures:   Critical Care Time:   Aspirin: (was aspirin given for stroke?)    Diagnosis     Clinical Impression: No diagnosis found. Disposition:       Follow-up Information    None          Patient's Medications   Start Taking    No medications on file   Continue Taking    IBUPROFEN (MOTRIN) 600 MG TABLET    Take 1 Tablet by mouth every six (6) hours as needed for Pain.     LIDOCAINE (LIDOCAINE VISCOUS) 2 % SOLUTION    Put 10 mL in mouth and swish and spit out QAC and at bedtime   These Medications have changed    No medications on file   Stop Taking    No medications on file     _______________________________    Notes:    Tj Bourne MD using Dragon dictation      _______________________________

## 2021-12-29 ENCOUNTER — PATIENT OUTREACH (OUTPATIENT)
Dept: CASE MANAGEMENT | Age: 21
End: 2021-12-29

## 2021-12-29 LAB — SARS-COV-2, NAA: DETECTED

## 2021-12-29 NOTE — PROGRESS NOTES
Patient contacted regarding COVID-19 diagnosis. Discussed COVID-19 related testing which was available at this time. Test results were positive. Patient informed of results, if available? yes. Care Transition Nurse contacted the patient by telephone to perform post discharge assessment. Call within 2 business days of discharge: Yes Verified name and  with patient as identifiers. Provided introduction to self, and explanation of the CTN/ACM role, and reason for call due to risk factors for infection and/or exposure to COVID-19. Symptoms reviewed with patient who verbalized the following symptoms: fatigue, loss or taste or smell, no new symptoms and no worsening symptoms      Due to no new or worsening symptoms encounter was not routed to provider for escalation. Discussed follow-up appointments. If no appointment was previously scheduled, appointment scheduling offered:  no. [address] follow up appointment(s): No future appointments. Non-[facility] follow up appointment(s): none    Interventions to address risk factors: Obtained and reviewed discharge summary and/or continuity of care documents     Advance Care Planning:   Does patient have an Advance Directive: not on file. Educated patient about risk for severe COVID-19 due to risk factors according to CDC guidelines. CTN reviewed discharge instructions, medical action plan and red flag symptoms with the patient who verbalized understanding. Discussed COVID vaccination status: yes. Education provided on COVID-19 vaccination as appropriate. Discussed exposure protocols and quarantine with CDC Guidelines. Patient was given an opportunity to verbalize any questions and concerns and agrees to contact CTN or health care provider for questions related to their healthcare. Reviewed and educated patient on any new and changed medications related to discharge diagnosis     Was patient discharged with a pulse oximeter? no     CTN provided contact information.  Plan for follow-up call in 5-7 days based on severity of symptoms and risk factors.

## 2022-01-06 LAB
C TRACH RRNA SPEC QL NAA+PROBE: NEGATIVE
N GONORRHOEA RRNA SPEC QL NAA+PROBE: NEGATIVE
SPECIMEN SOURCE: NORMAL

## 2022-01-24 ENCOUNTER — HOSPITAL ENCOUNTER (EMERGENCY)
Age: 22
Discharge: HOME | End: 2022-01-24
Attending: EMERGENCY MEDICINE

## 2022-01-24 VITALS
HEART RATE: 91 BPM | TEMPERATURE: 98.9 F | RESPIRATION RATE: 20 BRPM | OXYGEN SATURATION: 99 % | SYSTOLIC BLOOD PRESSURE: 97 MMHG | DIASTOLIC BLOOD PRESSURE: 68 MMHG

## 2022-01-24 DIAGNOSIS — L02.416 CELLULITIS AND ABSCESS OF LEFT LEG: Primary | ICD-10-CM

## 2022-01-24 DIAGNOSIS — L03.116 CELLULITIS AND ABSCESS OF LEFT LEG: Primary | ICD-10-CM

## 2022-01-24 PROCEDURE — 75810000293 HC SIMP/SUPERF WND  RPR

## 2022-01-24 PROCEDURE — 99281 EMR DPT VST MAYX REQ PHY/QHP: CPT

## 2022-01-24 RX ORDER — CLINDAMYCIN HYDROCHLORIDE 300 MG/1
300 CAPSULE ORAL 4 TIMES DAILY
Qty: 40 CAPSULE | Refills: 0 | Status: SHIPPED | OUTPATIENT
Start: 2022-01-24 | End: 2022-02-03

## 2022-01-24 NOTE — ED TRIAGE NOTES
Client reports red raised area to l. Upper calf x 1 week. Client tried to isaac on her own clear drainage noted. Client has large Grape sized lump with redness around base. Client reports pain with ambulation. NAD. AXOX4.

## 2022-01-24 NOTE — ED PROVIDER NOTES
[address] [facility]  [facility] EMERGENCY DEPT    Patient Name: Socorro Dixon    History of Presenting Illness     24 y.o. female otherwise healthy presents the ED complaining of an abscess/cellulitis to her left medial lower leg onset 1 week ago. Patient states she attempted to drain it, there is clear drainage from it. Notes having constant moderate throbbing pain to the site. Denies any fever, chills, other symptoms. Patient denies any other associated signs or symptoms. Patient denies any other complaints. Nursing notes regarding the HPI and triage nursing notes were reviewed. Prior medical records were reviewed. Past History     Past Medical History:  Past Medical History:   Diagnosis Date    Asthma     Miscarriage        Past Surgical History:  No past surgical history on file. Family History:  No family history on file. Social History:  Social History     Tobacco Use    Smoking status: Never Smoker    Smokeless tobacco: Never Used   Substance Use Topics    Alcohol use: No    Drug use: No       Allergies:  No Known Allergies    Patient's primary care provider (as noted in EPIC):  None    Review of Systems   Constitutional:  Denies malaise, fever, chills. Head:  Denies injury. Face:  Denies injury or pain. ENMT:  Denies sore throat. Extremity/MS:  Denies injury or pain. Neuro:  Denies headache, LOC, dizziness, neurologic symptoms/deficits/paresthesias. Skin: + redness/swelling/pain to left lower leg. All other systems negative as reviewed. Visit Vitals  BP 97/68 (BP 1 Location: Left upper arm, BP Patient Position: At rest)   Pulse 91   Temp 98.9 °F (37.2 °C)   Resp 20   SpO2 99%       Patient Vitals for the past 12 hrs:   Temp Pulse Resp BP SpO2   01/24/22 1219 98.9 °F (37.2 °C) 91 20 97/68 99 %       PHYSICAL EXAM:    CONSTITUTIONAL:  Alert, in no apparent distress;  well developed;  well nourished. HEAD:  Normocephalic, atraumatic. EYES:  EOMI.   Non-icteric sclera. Normal conjunctiva. ENTM:  Nose:  no rhinorrhea. Throat:  no erythema or exudate, mucous membranes moist.  NECK:  Supple  RESPIRATORY:  Chest clear, equal breath sounds, good air movement. CARDIOVASCULAR:  Regular rate and rhythm. No murmurs, rubs, or gallops. LOWER EXT:  See skin. NEURO:  Moves all four extremities, and grossly normal motor exam.  SKIN:  + left lower leg, superior medial aspect with 6cm region of erythema/warmth with central 3cm region of fluctuance. PSYCH:  Alert and normal affect. ED COURSE:      PROCEDURE NOTE:  INCISION AND DRAINAGE    Incision Site: Left leg   Local anesthetic:  Lidocaine 1% without epi    Noted anesthetic was injected locally for local anesthesia. Using a scalpel, the site was opened and discharge as noted was drained from the site, approximately 10 cc of blood and purulence. The abscess area was probed, and loculations were broken up. The patient tolerated the procedure well. ED COURSE AND MEDICAL DECISION MAKING:    The patient does not appear toxic from the abscess, with no signs of sepsis by presentation, physical examination, nor by vital signs. IMPRESSION AND MEDICAL DECISION MAKING:  Based upon the patient's presentation with noted HPI and PE, along with the work up done in the emergency department, I believe that the patient is having noted abscess. Will treat with clindamycin and have f/u with PCP. Strict instruction to return for any acute worsening. The patient appears nontoxic at time of discharge. Diagnosis:   1.  Cellulitis and abscess of left leg      Disposition: Discharge    Follow-up Information     Follow up With Specialties Details [facility] [address]  In 3 days  [address], [address] [facility] [address] [phone]    [facility] EMERGENCY DEPT Emergency Medicine  If symptoms worsen [address] [facility] [address]  [phone]          Discharge Medication List as of 1/24/2022  2:09 PM      START taking these medications    Details   clindamycin (CLEOCIN) 300 mg capsule Take 1 Capsule by mouth four (4) times daily for 10 days. , Normal, Disp-40 Capsule, R-0           [facility]

## 2022-05-02 ENCOUNTER — APPOINTMENT (OUTPATIENT)
Dept: GENERAL RADIOLOGY | Age: 22
End: 2022-05-02
Attending: PHYSICIAN ASSISTANT
Payer: MEDICAID

## 2022-05-02 ENCOUNTER — HOSPITAL ENCOUNTER (EMERGENCY)
Age: 22
Discharge: HOME | End: 2022-05-02
Attending: STUDENT IN AN ORGANIZED HEALTH CARE EDUCATION/TRAINING PROGRAM
Payer: MEDICAID

## 2022-05-02 VITALS
DIASTOLIC BLOOD PRESSURE: 68 MMHG | SYSTOLIC BLOOD PRESSURE: 95 MMHG | TEMPERATURE: 98.1 F | OXYGEN SATURATION: 100 % | HEIGHT: 66 IN | HEART RATE: 66 BPM | WEIGHT: 183 LBS | BODY MASS INDEX: 29.41 KG/M2 | RESPIRATION RATE: 18 BRPM

## 2022-05-02 DIAGNOSIS — Z11.3 SCREENING FOR STD (SEXUALLY TRANSMITTED DISEASE): ICD-10-CM

## 2022-05-02 DIAGNOSIS — R53.83 FATIGUE, UNSPECIFIED TYPE: Primary | ICD-10-CM

## 2022-05-02 LAB
ALBUMIN SERPL-MCNC: 4.3 G/DL (ref 3.4–5)
ALBUMIN/GLOB SERPL: 1 {RATIO} (ref 0.8–1.7)
ALP SERPL-CCNC: 51 U/L (ref 45–117)
ALT SERPL-CCNC: 32 U/L (ref 13–56)
ANION GAP SERPL CALC-SCNC: 7 MMOL/L (ref 3–18)
APPEARANCE UR: ABNORMAL
AST SERPL-CCNC: 27 U/L (ref 10–38)
BACTERIA URNS QL MICRO: ABNORMAL /HPF
BASOPHILS # BLD: 0 K/UL (ref 0–0.1)
BASOPHILS NFR BLD: 0 % (ref 0–2)
BILIRUB SERPL-MCNC: 0.4 MG/DL (ref 0.2–1)
BILIRUB UR QL: NEGATIVE
BUN SERPL-MCNC: 11 MG/DL (ref 7–18)
BUN/CREAT SERPL: 16 (ref 12–20)
CALCIUM SERPL-MCNC: 9.9 MG/DL (ref 8.5–10.1)
CHLORIDE SERPL-SCNC: 107 MMOL/L (ref 100–111)
CO2 SERPL-SCNC: 26 MMOL/L (ref 21–32)
COLOR UR: ABNORMAL
CREAT SERPL-MCNC: 0.69 MG/DL (ref 0.6–1.3)
DIFFERENTIAL METHOD BLD: ABNORMAL
EOSINOPHIL # BLD: 0 K/UL (ref 0–0.4)
EOSINOPHIL NFR BLD: 0 % (ref 0–5)
EPITH CASTS URNS QL MICRO: ABNORMAL /LPF (ref 0–5)
ERYTHROCYTE [DISTWIDTH] IN BLOOD BY AUTOMATED COUNT: 12.7 % (ref 11.6–14.5)
GLOBULIN SER CALC-MCNC: 4.2 G/DL (ref 2–4)
GLUCOSE SERPL-MCNC: 85 MG/DL (ref 74–99)
GLUCOSE UR STRIP.AUTO-MCNC: NEGATIVE MG/DL
HCG SERPL QL: NEGATIVE
HCT VFR BLD AUTO: 41.2 % (ref 35–45)
HGB BLD-MCNC: 13.7 G/DL (ref 12–16)
HGB UR QL STRIP: NEGATIVE
IMM GRANULOCYTES # BLD AUTO: 0 K/UL (ref 0–0.04)
IMM GRANULOCYTES NFR BLD AUTO: 0 % (ref 0–0.5)
KETONES UR QL STRIP.AUTO: 15 MG/DL
LEUKOCYTE ESTERASE UR QL STRIP.AUTO: ABNORMAL
LYMPHOCYTES # BLD: 2 K/UL (ref 0.9–3.6)
LYMPHOCYTES NFR BLD: 27 % (ref 21–52)
MCH RBC QN AUTO: 30.4 PG (ref 24–34)
MCHC RBC AUTO-ENTMCNC: 33.3 G/DL (ref 31–37)
MCV RBC AUTO: 91.4 FL (ref 78–100)
MONOCYTES # BLD: 0.4 K/UL (ref 0.05–1.2)
MONOCYTES NFR BLD: 5 % (ref 3–10)
MUCOUS THREADS URNS QL MICRO: ABNORMAL /LPF
NEUTS SEG # BLD: 4.8 K/UL (ref 1.8–8)
NEUTS SEG NFR BLD: 67 % (ref 40–73)
NITRITE UR QL STRIP.AUTO: NEGATIVE
NRBC # BLD: 0 K/UL (ref 0–0.01)
NRBC BLD-RTO: 0 PER 100 WBC
PH UR STRIP: 5.5 [PH] (ref 5–8)
PLATELET # BLD AUTO: 467 K/UL (ref 135–420)
PMV BLD AUTO: 10 FL (ref 9.2–11.8)
POTASSIUM SERPL-SCNC: 3.7 MMOL/L (ref 3.5–5.5)
PROT SERPL-MCNC: 8.5 G/DL (ref 6.4–8.2)
PROT UR STRIP-MCNC: ABNORMAL MG/DL
RBC # BLD AUTO: 4.51 M/UL (ref 4.2–5.3)
RBC #/AREA URNS HPF: NEGATIVE /HPF (ref 0–5)
SARS-COV-2, COV2: NORMAL
SARS-COV-2, NAA: NOT DETECTED
SODIUM SERPL-SCNC: 140 MMOL/L (ref 136–145)
SP GR UR REFRACTOMETRY: >1.03 (ref 1–1.03)
UROBILINOGEN UR QL STRIP.AUTO: 1 EU/DL (ref 0.2–1)
WBC # BLD AUTO: 7.2 K/UL (ref 4.6–13.2)
WBC URNS QL MICRO: ABNORMAL /HPF (ref 0–4)

## 2022-05-02 PROCEDURE — 80053 COMPREHEN METABOLIC PANEL: CPT

## 2022-05-02 PROCEDURE — 84703 CHORIONIC GONADOTROPIN ASSAY: CPT

## 2022-05-02 PROCEDURE — 81001 URINALYSIS AUTO W/SCOPE: CPT

## 2022-05-02 PROCEDURE — 87491 CHLMYD TRACH DNA AMP PROBE: CPT

## 2022-05-02 PROCEDURE — 71046 X-RAY EXAM CHEST 2 VIEWS: CPT

## 2022-05-02 PROCEDURE — 99284 EMERGENCY DEPT VISIT MOD MDM: CPT

## 2022-05-02 PROCEDURE — 87661 TRICHOMONAS VAGINALIS AMPLIF: CPT

## 2022-05-02 PROCEDURE — U0003 INFECTIOUS AGENT DETECTION BY NUCLEIC ACID (DNA OR RNA); SEVERE ACUTE RESPIRATORY SYNDROME CORONAVIRUS 2 (SARS-COV-2) (CORONAVIRUS DISEASE [COVID-19]), AMPLIFIED PROBE TECHNIQUE, MAKING USE OF HIGH THROUGHPUT TECHNOLOGIES AS DESCRIBED BY CMS-2020-01-R: HCPCS

## 2022-05-02 PROCEDURE — 85025 COMPLETE CBC W/AUTO DIFF WBC: CPT

## 2022-05-02 RX ORDER — DOXYCYCLINE HYCLATE 100 MG
100 TABLET ORAL 2 TIMES DAILY
Qty: 14 TABLET | Refills: 0 | Status: SHIPPED | OUTPATIENT
Start: 2022-05-02 | End: 2022-05-09

## 2022-05-02 NOTE — DISCHARGE INSTRUCTIONS
You were treated for exposure to a possible STD. No sexual activity for the next 2 weeks. Any sexual partner(s) should be checked for STD's as well. You should follow-up with the [facility] for any further testing for STDs, including HIV.     Address: [address]  Telephone: [phone]

## 2022-05-02 NOTE — ED PROVIDER NOTES
EMERGENCY DEPARTMENT HISTORY AND PHYSICAL EXAM    12:02 PM      Date: 5/2/2022  Patient Name: Troy Qureshi    History of Presenting Illness     Chief Complaint   Patient presents with    Abdominal Pain    Headache       History Provided By: Patient    Additional History (Context): Troy Qureshi is a 24 y.o. female with hx of asthma who presents with c/o intermittent diffuse abdominal pain, fatigue, frontal HA, and decreased appetite x 3 days. Patient denies fever or chills, ear pain, sore throat, cough, nausea, vomiting, diarrhea, dysuria, hematuria, vaginal discharge, vaginal bleeding. Patient notes concern for STI, requesting testing and treatment today. Patient notes she is not vaccinated for COVID, denies sick contacts or known exposure to Otilia. Tried Motrin without relief of symptoms. PCP: None    Past History     Past Medical History:  Past Medical History:   Diagnosis Date    Asthma     Miscarriage        Past Surgical History:  No past surgical history on file. Family History:  No family history on file. Social History:  Social History     Tobacco Use    Smoking status: Never Smoker    Smokeless tobacco: Never Used   Substance Use Topics    Alcohol use: No    Drug use: No       Allergies:  No Known Allergies      Review of Systems       Review of Systems   Constitutional: Positive for appetite change and fatigue. Negative for chills and fever. Respiratory: Negative for shortness of breath. Cardiovascular: Negative for chest pain. Gastrointestinal: Positive for abdominal pain. Negative for nausea and vomiting. Skin: Negative for rash. Neurological: Positive for headaches. Negative for weakness. All other systems reviewed and are negative. Physical Exam     Visit Vitals  BP 95/68   Pulse 66   Temp 98.1 °F (36.7 °C)   Resp 18   Ht 5' 6\" (1.676 m)   Wt 83 kg (183 lb)   SpO2 100%   BMI 29.54 kg/m²         Physical Exam  Vitals and nursing note reviewed.    Constitutional: General: She is not in acute distress. Appearance: Normal appearance. She is well-developed. She is not ill-appearing, toxic-appearing or diaphoretic. HENT:      Head: Normocephalic and atraumatic. Right Ear: Tympanic membrane, ear canal and external ear normal.      Left Ear: Tympanic membrane, ear canal and external ear normal.      Nose: Nose normal.      Mouth/Throat:      Mouth: Mucous membranes are moist.      Pharynx: No oropharyngeal exudate or posterior oropharyngeal erythema. Eyes:      Pupils: Pupils are equal, round, and reactive to light. Cardiovascular:      Rate and Rhythm: Normal rate and regular rhythm. Heart sounds: Normal heart sounds. No murmur heard. No friction rub. No gallop. Pulmonary:      Effort: Pulmonary effort is normal. No respiratory distress. Breath sounds: Normal breath sounds. No wheezing or rales. Abdominal:      General: Abdomen is flat. There is no distension. Palpations: Abdomen is soft. Tenderness: There is no abdominal tenderness. There is no right CVA tenderness, left CVA tenderness, guarding or rebound. Musculoskeletal:         General: Normal range of motion. Cervical back: Normal range of motion and neck supple. No rigidity. Lymphadenopathy:      Cervical: No cervical adenopathy. Skin:     General: Skin is warm. Findings: No rash. Neurological:      Mental Status: She is alert.            Diagnostic Study Results     Labs -  Recent Results (from the past 12 hour(s))   URINALYSIS W/ RFLX MICROSCOPIC    Collection Time: 05/02/22 12:06 PM   Result Value Ref Range    Color DARK YELLOW      Appearance CLOUDY      Specific gravity >1.030 (H) 1.005 - 1.030    pH (UA) 5.5 5.0 - 8.0      Protein TRACE (A) NEG mg/dL    Glucose Negative NEG mg/dL    Ketone 15 (A) NEG mg/dL    Bilirubin Negative NEG      Blood Negative NEG      Urobilinogen 1.0 0.2 - 1.0 EU/dL    Nitrites Negative NEG      Leukocyte Esterase MODERATE (A) NEG     URINE MICROSCOPIC ONLY    Collection Time: 05/02/22 12:06 PM   Result Value Ref Range    WBC 1 to 4 0 - 4 /hpf    RBC Negative 0 - 5 /hpf    Epithelial cells 4+ 0 - 5 /lpf    Bacteria 4+ (A) NEG /hpf    Mucus 1+ (A) NEG /lpf   HCG QL SERUM    Collection Time: 05/02/22  1:05 PM   Result Value Ref Range    HCG, Ql. Negative NEG     CBC WITH AUTOMATED DIFF    Collection Time: 05/02/22  1:05 PM   Result Value Ref Range    WBC 7.2 4.6 - 13.2 K/uL    RBC 4.51 4.20 - 5.30 M/uL    HGB 13.7 12.0 - 16.0 g/dL    HCT 41.2 35.0 - 45.0 %    MCV 91.4 78.0 - 100.0 FL    MCH 30.4 24.0 - 34.0 PG    MCHC 33.3 31.0 - 37.0 g/dL    RDW 12.7 11.6 - 14.5 %    PLATELET 163 (H) 696 - 420 K/uL    MPV 10.0 9.2 - 11.8 FL    NRBC 0.0 0  WBC    ABSOLUTE NRBC 0.00 0.00 - 0.01 K/uL    NEUTROPHILS 67 40 - 73 %    LYMPHOCYTES 27 21 - 52 %    MONOCYTES 5 3 - 10 %    EOSINOPHILS 0 0 - 5 %    BASOPHILS 0 0 - 2 %    IMMATURE GRANULOCYTES 0 0.0 - 0.5 %    ABS. NEUTROPHILS 4.8 1.8 - 8.0 K/UL    ABS. LYMPHOCYTES 2.0 0.9 - 3.6 K/UL    ABS. MONOCYTES 0.4 0.05 - 1.2 K/UL    ABS. EOSINOPHILS 0.0 0.0 - 0.4 K/UL    ABS. BASOPHILS 0.0 0.0 - 0.1 K/UL    ABS. IMM. GRANS. 0.0 0.00 - 0.04 K/UL    DF AUTOMATED     METABOLIC PANEL, COMPREHENSIVE    Collection Time: 05/02/22  1:05 PM   Result Value Ref Range    Sodium 140 136 - 145 mmol/L    Potassium 3.7 3.5 - 5.5 mmol/L    Chloride 107 100 - 111 mmol/L    CO2 26 21 - 32 mmol/L    Anion gap 7 3.0 - 18 mmol/L    Glucose 85 74 - 99 mg/dL    BUN 11 7.0 - 18 MG/DL    Creatinine 0.69 0.6 - 1.3 MG/DL    BUN/Creatinine ratio 16 12 - 20      GFR est AA >60 >60 ml/min/1.73m2    GFR est non-AA >60 >60 ml/min/1.73m2    Calcium 9.9 8.5 - 10.1 MG/DL    Bilirubin, total 0.4 0.2 - 1.0 MG/DL    ALT (SGPT) 32 13 - 56 U/L    AST (SGOT) 27 10 - 38 U/L    Alk.  phosphatase 51 45 - 117 U/L    Protein, total 8.5 (H) 6.4 - 8.2 g/dL    Albumin 4.3 3.4 - 5.0 g/dL    Globulin 4.2 (H) 2.0 - 4.0 g/dL    A-G Ratio 1.0 0.8 - 1.7 SARS-COV-2    Collection Time: 05/02/22  1:05 PM   Result Value Ref Range    SARS-CoV-2 by PCR Please find results under separate order         Radiologic Studies -   XR CHEST PA LAT   Final Result      Normal study. Medical Decision Making   I am the first provider for this patient. I reviewed the vital signs, available nursing notes, past medical history, past surgical history, family history and social history. Vital Signs-Reviewed the patient's vital signs. Records Reviewed: Nursing Notes and Old Medical Records (Time of Review: 12:02 PM)    ED Course: Progress Notes, Reevaluation, and Consults:  2:00 PM: Pt requesting to go home. Reviewed results and plan with patient. Discussed need for close outpatient follow-up this week for reassessment. Discussed strict return precautions, including fever, vomiting, or any other medical concerns. In agreement with plan, ready to go home. Provider Notes (Medical Decision Making): 79-year-old female who presents to the ED due to abdominal pain, headache, decreased appetite, and concern for STIs. Afebrile, nontoxic-appearing, looks well. Abdomen soft and nontender to palpation. Labs essentially unremarkable, hCG negative. Cultures sent and pending. Treated empirically in the ED. Patient is requesting to go home. Stable for discharge with close outpatient follow-up for further assessment. Strict return precautions provided. Diagnosis     Clinical Impression:   1. Fatigue, unspecified type    2.  Screening for STD (sexually transmitted disease)        Disposition: home     Follow-up Information     Follow up With Specialties Details Why [address] [facility] EMERGENCY DEPT Emergency Medicine  If symptoms worsen [address] [place] [address]  Gita [address]    Jose Hong 3  [address]  [phone]           Patient's Medications   Start Taking    DOXYCYCLINE (VIBRA-TABS) 100 MG TABLET    Take 1 Tablet by mouth two (2) times a day for 7 days. Continue Taking    No medications on file   These Medications have changed    No medications on file   Stop Taking    No medications on file       Dictation disclaimer:  Please note that this dictation was completed with Everdream, the computer voice recognition software. Quite often unanticipated grammatical, syntax, homophones, and other interpretive errors are inadvertently transcribed by the computer software. Please disregard these errors. Please excuse any errors that have escaped final proofreading.

## 2022-05-02 NOTE — Clinical Note
[address] [place] [address] Dr DAN[facility] AL[facility] EMERGENCY DEPT  [address] [address]  [phone]    Work/School Note    Date: 5/2/2022    To Whom It May concern:    Yolanda Pennington was seen and treated today in the emergency room by the following provider(s):  Attending Provider: Suresh Tesfaye MD  Physician Assistant: Samia Sherman, [address] [place], [place] [address]rhina Chang. Yolanda Pennington is excused from work/school on 05/02/22 and 05/03/22. She is medically clear to return to work/school on 5/4/2022.        Sincerely,          HESHAM Sethi

## 2022-05-06 LAB
SPECIMEN SOURCE: NORMAL
T VAGINALIS RRNA SPEC QL NAA+PROBE: NEGATIVE

## 2022-06-26 ENCOUNTER — HOSPITAL ENCOUNTER (EMERGENCY)
Age: 22
Discharge: HOME | End: 2022-06-26
Attending: STUDENT IN AN ORGANIZED HEALTH CARE EDUCATION/TRAINING PROGRAM
Payer: MEDICAID

## 2022-06-26 VITALS
HEIGHT: 66 IN | HEART RATE: 80 BPM | BODY MASS INDEX: 29.09 KG/M2 | SYSTOLIC BLOOD PRESSURE: 108 MMHG | DIASTOLIC BLOOD PRESSURE: 65 MMHG | WEIGHT: 181 LBS | OXYGEN SATURATION: 98 % | TEMPERATURE: 99.5 F | RESPIRATION RATE: 16 BRPM

## 2022-06-26 DIAGNOSIS — K08.89 DENTALGIA: Primary | ICD-10-CM

## 2022-06-26 PROCEDURE — 99283 EMERGENCY DEPT VISIT LOW MDM: CPT

## 2022-06-26 PROCEDURE — 74011250637 HC RX REV CODE- 250/637: Performed by: STUDENT IN AN ORGANIZED HEALTH CARE EDUCATION/TRAINING PROGRAM

## 2022-06-26 RX ORDER — AMOXICILLIN 875 MG/1
875 TABLET, FILM COATED ORAL 2 TIMES DAILY
Qty: 20 TABLET | Refills: 0 | Status: SHIPPED | OUTPATIENT
Start: 2022-06-26 | End: 2022-07-06

## 2022-06-26 RX ORDER — ACETAMINOPHEN 500 MG
1000 TABLET ORAL ONCE
Status: COMPLETED | OUTPATIENT
Start: 2022-06-26 | End: 2022-06-26

## 2022-06-26 RX ORDER — ACETAMINOPHEN 500 MG
1000 TABLET ORAL
Refills: 0 | Status: SHIPPED | COMMUNITY

## 2022-06-26 RX ORDER — IBUPROFEN 800 MG/1
800 TABLET ORAL
Qty: 20 TABLET | Refills: 0 | Status: SHIPPED | COMMUNITY

## 2022-06-26 RX ADMIN — ACETAMINOPHEN 1000 MG: 500 TABLET ORAL at 14:04

## 2022-06-26 NOTE — Clinical Note
[address] [facility] EMERGENCY DEPT  [address] [id number] [phone]    Work/School Note    Date: 6/26/2022    To Whom It May concern:      Ed Jaimes was seen and treated today in the emergency room by the following provider(s):  Attending Provider: Romero Snowden MD.      Ed Jaimes is excused from work/school on 06/26/22. She is clear to return to work/school on 06/27/22.         Sincerely,          Jj Wong MD

## 2022-06-26 NOTE — DISCHARGE INSTRUCTIONS
Follow-up with one of the dental clinics below:  Call this office first: [facility] [address] [facility] [phone]  [place] [phone] Dental [phone]  [place] [facility] [phone]      Call to schedule an appointment.

## 2022-06-26 NOTE — Clinical Note
[address] [facility] EMERGENCY DEPT  [address] [id number] [phone]    Work/School Note    Date: 6/26/2022    To Whom It May concern:      Armando Pastor was seen and treated today in the emergency room by the following provider(s):  Attending Provider: Becky Campbell MD.      Armando Pastor is excused from work/school on 06/26/22. She is clear to return to work/school on 06/27/22.         Sincerely,          Cheyenne Gross MD

## 2022-06-26 NOTE — ED TRIAGE NOTES
C/o left upper and lower wisdom tooth pain x 2 days. C/o headache. Denies having appointment for dental extraction. Patient requests work note.

## 2022-06-26 NOTE — ED PROVIDER NOTES
Patient is a 79-year-old female denies any pertinent medical history. Patient present with primary complaint of dental pain for the last 2 to 3 days that is only partially responded to 500 mg of Tylenol and 600 mg of ibuprofen with associated topical anesthetics. Patient reports no measured fever, pain with swallowing, stridor, or any difficulty handling her secretions. Patient states that she still has her wisdom teeth and as of yet has not made plans to have them removed. Past Medical History:   Diagnosis Date    Asthma     Miscarriage        History reviewed. No pertinent surgical history. History reviewed. No pertinent family history. Social History     Socioeconomic History    Marital status: SINGLE     Spouse name: Not on file    Number of children: Not on file    Years of education: Not on file    Highest education level: Not on file   Occupational History    Not on file   Tobacco Use    Smoking status: Current Every Day Smoker    Smokeless tobacco: Never Used   Substance and Sexual Activity    Alcohol use: No    Drug use: No    Sexual activity: Not on file   Other Topics Concern    Not on file   Social History Narrative    Not on file     Social Determinants of Health     Financial Resource Strain:     Difficulty of Paying Living Expenses: Not on file   Food Insecurity:     Worried About Running Out of Food in the Last Year: Not on file    Philip of Food in the Last Year: Not on file   Transportation Needs:     Lack of Transportation (Medical): Not on file    Lack of Transportation (Non-Medical):  Not on file   Physical Activity:     Days of Exercise per Week: Not on file    Minutes of Exercise per Session: Not on file   Stress:     Feeling of Stress : Not on file   Social Connections:     Frequency of Communication with Friends and Family: Not on file    Frequency of Social Gatherings with Friends and Family: Not on file    Attends [employer] Services: Not on file   [address] [facility] [employer] or Organizations: Not on file    Attends Club or Organization Meetings: Not on file    Marital Status: Not on file   Intimate Partner Violence:     Fear of Current or Ex-Partner: Not on file    Emotionally Abused: Not on file    Physically Abused: Not on file    Sexually Abused: Not on file   Housing Stability:     Unable to Pay for Housing in the Last Year: Not on file    Number of [place] in the Last Year: Not on file    Unstable Housing in the Last Year: Not on file         ALLERGIES: Patient has no known allergies. Review of Systems   Constitutional: Negative for chills and fever. HENT: Positive for dental problem. Negative for rhinorrhea and sore throat. Eyes: Negative for discharge and redness. Respiratory: Negative for cough and shortness of breath. Cardiovascular: Negative for chest pain and leg swelling. Gastrointestinal: Negative for abdominal pain, diarrhea, nausea and vomiting. Genitourinary: Negative for difficulty urinating and dysuria. Musculoskeletal: Negative for back pain and neck pain. Skin: Negative for rash and wound. Neurological: Negative for syncope, light-headedness and headaches. Vitals:    06/26/22 1304 06/26/22 1321   BP: 108/65    Pulse: 80    Resp: 16    Temp: 99.5 °F (37.5 °C)    SpO2: 99% 98%   Weight: 82.1 kg (181 lb)    Height: 5' 6\" (1.676 m)             Physical Exam  Constitutional:       General: She is not in acute distress. Appearance: She is not ill-appearing, toxic-appearing or diaphoretic. HENT:      Head: Normocephalic and atraumatic. Right Ear: External ear normal.      Left Ear: External ear normal.      Nose: No congestion or rhinorrhea. Mouth/Throat:      Mouth: Mucous membranes are moist.      Pharynx: No oropharyngeal exudate or posterior oropharyngeal erythema.         Comments: Demonstrates tenderness to bilateral upper/lower wisdom teeth with no palpable abscess or bleeding. Eyes:      General:         Right eye: No discharge. Left eye: No discharge. Pupils: Pupils are equal, round, and reactive to light. Cardiovascular:      Rate and Rhythm: Normal rate and regular rhythm. Heart sounds: No murmur heard. No friction rub. No gallop. Pulmonary:      Effort: Pulmonary effort is normal. No respiratory distress. Breath sounds: No stridor. No wheezing, rhonchi or rales. Abdominal:      General: Abdomen is flat. There is no distension. Tenderness: There is no right CVA tenderness, left CVA tenderness, guarding or rebound. Musculoskeletal:         General: No swelling, tenderness, deformity or signs of injury. Cervical back: No rigidity or tenderness. Right lower leg: No edema. Left lower leg: No edema. Lymphadenopathy:      Cervical: No cervical adenopathy. Skin:     General: Skin is warm. Capillary Refill: Capillary refill takes less than 2 seconds. Coloration: Skin is not jaundiced or pale. Findings: No bruising, erythema, lesion or rash. Neurological:      General: No focal deficit present. Mental Status: She is alert and oriented to person, place, and time. Sensory: No sensory deficit. Motor: No weakness. Psychiatric:         Mood and Affect: Mood normal.          MDM  Number of Diagnoses or Management Options  Dentalgia: new and requires workup  Diagnosis management comments: Patient presents with primary complaint of bilateral teeth pain at site of wisdom teeth. Patient is otherwise well-appearing demonstrates no evidence of deep space infection and reports partial response to over-the-counter medications. Advised patient on full dose analgesia and provided her with information to arrange dental follow-up to treat the underlying cause of her pain. Patient discharged in good condition.          Procedures

## 2022-10-10 ENCOUNTER — APPOINTMENT (OUTPATIENT)
Dept: ULTRASOUND IMAGING | Age: 22
End: 2022-10-10
Attending: PHYSICIAN ASSISTANT
Payer: MEDICAID

## 2022-10-10 ENCOUNTER — HOSPITAL ENCOUNTER (EMERGENCY)
Age: 22
Discharge: HOME | End: 2022-10-10
Attending: STUDENT IN AN ORGANIZED HEALTH CARE EDUCATION/TRAINING PROGRAM
Payer: MEDICAID

## 2022-10-10 VITALS
HEIGHT: 66 IN | SYSTOLIC BLOOD PRESSURE: 120 MMHG | DIASTOLIC BLOOD PRESSURE: 75 MMHG | HEART RATE: 75 BPM | OXYGEN SATURATION: 100 % | WEIGHT: 171 LBS | BODY MASS INDEX: 27.48 KG/M2 | TEMPERATURE: 99 F | RESPIRATION RATE: 16 BRPM

## 2022-10-10 DIAGNOSIS — O20.0 THREATENED MISCARRIAGE: Primary | ICD-10-CM

## 2022-10-10 LAB
ABO + RH BLD: NORMAL
ALBUMIN SERPL-MCNC: 4 G/DL (ref 3.4–5)
ALBUMIN/GLOB SERPL: 1.1 {RATIO} (ref 0.8–1.7)
ALP SERPL-CCNC: 60 U/L (ref 45–117)
ALT SERPL-CCNC: 17 U/L (ref 13–56)
ANION GAP SERPL CALC-SCNC: 8 MMOL/L (ref 3–18)
AST SERPL-CCNC: 14 U/L (ref 10–38)
BASOPHILS # BLD: 0.1 K/UL (ref 0–0.1)
BASOPHILS NFR BLD: 1 % (ref 0–2)
BILIRUB SERPL-MCNC: 0.4 MG/DL (ref 0.2–1)
BLOOD GROUP ANTIBODIES SERPL: NORMAL
BUN SERPL-MCNC: 7 MG/DL (ref 7–18)
BUN/CREAT SERPL: 12 (ref 12–20)
CALCIUM SERPL-MCNC: 9 MG/DL (ref 8.5–10.1)
CHLORIDE SERPL-SCNC: 104 MMOL/L (ref 100–111)
CO2 SERPL-SCNC: 27 MMOL/L (ref 21–32)
CREAT SERPL-MCNC: 0.6 MG/DL (ref 0.6–1.3)
DIFFERENTIAL METHOD BLD: ABNORMAL
EOSINOPHIL # BLD: 0 K/UL (ref 0–0.4)
EOSINOPHIL NFR BLD: 0 % (ref 0–5)
ERYTHROCYTE [DISTWIDTH] IN BLOOD BY AUTOMATED COUNT: 13 % (ref 11.6–14.5)
GLOBULIN SER CALC-MCNC: 3.6 G/DL (ref 2–4)
GLUCOSE SERPL-MCNC: 92 MG/DL (ref 74–99)
HCG SERPL-ACNC: 760 MIU/ML (ref 0–10)
HCT VFR BLD AUTO: 34.9 % (ref 35–45)
HGB BLD-MCNC: 11.8 G/DL (ref 12–16)
IMM GRANULOCYTES # BLD AUTO: 0 K/UL (ref 0–0.04)
IMM GRANULOCYTES NFR BLD AUTO: 0 % (ref 0–0.5)
LYMPHOCYTES # BLD: 2.4 K/UL (ref 0.9–3.6)
LYMPHOCYTES NFR BLD: 24 % (ref 21–52)
MCH RBC QN AUTO: 30.9 PG (ref 24–34)
MCHC RBC AUTO-ENTMCNC: 33.8 G/DL (ref 31–37)
MCV RBC AUTO: 91.4 FL (ref 78–100)
MONOCYTES # BLD: 0.6 K/UL (ref 0.05–1.2)
MONOCYTES NFR BLD: 6 % (ref 3–10)
NEUTS SEG # BLD: 6.8 K/UL (ref 1.8–8)
NEUTS SEG NFR BLD: 69 % (ref 40–73)
NRBC # BLD: 0 K/UL (ref 0–0.01)
NRBC BLD-RTO: 0 PER 100 WBC
PLATELET # BLD AUTO: 392 K/UL (ref 135–420)
PMV BLD AUTO: 10.4 FL (ref 9.2–11.8)
POTASSIUM SERPL-SCNC: 3.7 MMOL/L (ref 3.5–5.5)
PROT SERPL-MCNC: 7.6 G/DL (ref 6.4–8.2)
RBC # BLD AUTO: 3.82 M/UL (ref 4.2–5.3)
SODIUM SERPL-SCNC: 139 MMOL/L (ref 136–145)
SPECIMEN EXP DATE BLD: NORMAL
WBC # BLD AUTO: 10 K/UL (ref 4.6–13.2)

## 2022-10-10 PROCEDURE — 76817 TRANSVAGINAL US OBSTETRIC: CPT

## 2022-10-10 PROCEDURE — 99284 EMERGENCY DEPT VISIT MOD MDM: CPT

## 2022-10-10 PROCEDURE — 80053 COMPREHEN METABOLIC PANEL: CPT

## 2022-10-10 PROCEDURE — 84702 CHORIONIC GONADOTROPIN TEST: CPT

## 2022-10-10 PROCEDURE — 86900 BLOOD TYPING SEROLOGIC ABO: CPT

## 2022-10-10 PROCEDURE — 85025 COMPLETE CBC W/AUTO DIFF WBC: CPT

## 2022-10-10 NOTE — ED PROVIDER NOTES
[facility]  Emergency Department Treatment Report        6:31 PM Sera Choudhury is a 20 y.o. female who presents to ED with a complaint of vaginal bleeding. Patient states she has had positive pregnancy test at home think she is very early pregnancy. Patient states that she has been bleeding on and off for about 5 days she has had some days that have been very heavy. States is light at this time and it is on and off. She has not seen an OB/GYN no other symptoms no abdominal pain reported. No other complaints, associated symptoms or modifying factors at this time. PCP: None        The history is provided by the patient. No  was used. Vaginal Bleeding  This is a new problem. The current episode started more than 2 days ago. The problem has not changed since onset. Pertinent negatives include no chest pain, no abdominal pain, no headaches and no shortness of breath. Nothing aggravates the symptoms. Nothing relieves the symptoms. She has tried nothing for the symptoms. Pregnancy Problem  Pertinent negatives include no chest pain, no abdominal pain, no headaches and no shortness of breath. Past Medical History:   Diagnosis Date    Asthma     Miscarriage        No past surgical history on file. No family history on file.     Social History     Socioeconomic History    Marital status: SINGLE     Spouse name: Not on file    Number of children: Not on file    Years of education: Not on file    Highest education level: Not on file   Occupational History    Not on file   Tobacco Use    Smoking status: Every Day    Smokeless tobacco: Never   Substance and Sexual Activity    Alcohol use: No    Drug use: No    Sexual activity: Not on file   Other Topics Concern    Not on file   Social History Narrative    Not on file     Social Determinants of Health     Financial Resource Strain: Not on file   Food Insecurity: Not on file   Transportation Needs: Not on file   Physical Activity: Not on file   Stress: Not on file   Social Connections: Not on file   Intimate Partner Violence: Not on file   Housing Stability: Not on file         ALLERGIES: Patient has no known allergies. Review of Systems   Respiratory:  Negative for shortness of breath. Cardiovascular:  Negative for chest pain. Gastrointestinal:  Negative for abdominal pain. Genitourinary:  Positive for vaginal bleeding. Neurological:  Negative for headaches. All other systems reviewed and are negative. Vitals:    10/10/22 1523   BP: 120/75   Pulse: 75   Resp: 16   Temp: 99 °F (37.2 °C)   SpO2: 100%   Weight: 77.6 kg (171 lb)   Height: 5' 6\" (1.676 m)            Physical Exam  Vitals and nursing note reviewed. Constitutional:       General: She is not in acute distress. Appearance: She is well-developed. She is not ill-appearing. HENT:      Head: Normocephalic and atraumatic. Eyes:      Conjunctiva/sclera: Conjunctivae normal.      Pupils: Pupils are equal, round, and reactive to light. Cardiovascular:      Rate and Rhythm: Normal rate and regular rhythm. Pulmonary:      Effort: Pulmonary effort is normal.      Breath sounds: Normal breath sounds. Abdominal:      General: Bowel sounds are normal. There is no distension. Palpations: Abdomen is soft. Tenderness: There is no abdominal tenderness. Musculoskeletal:         General: Normal range of motion. Cervical back: Normal range of motion and neck supple. Skin:     General: Skin is warm and dry. Capillary Refill: Capillary refill takes less than 2 seconds. Neurological:      Mental Status: She is alert and oriented to person, place, and time. Motor: No weakness. Psychiatric:         Behavior: Behavior normal.         Thought Content:  Thought content normal.         Judgment: Judgment normal.        MDM  Number of Diagnoses or Management Options  Threatened miscarriage: established and improving  Diagnosis management comments: When I evaluated the patient she already had labs done and abdominal ultrasound. Ultrasound showed no gestational sac and suspected already miscarriage or very very early pregnancy. No sign of ectopic pregnancy. Patient states the bleeding is very light at this time pelvic exam deferred. Informed patient of findings and to follow-up with OB/GYN with her primary care doctor return if worse. No other acute findings are suspected patient discharged to home       Amount and/or Complexity of Data Reviewed  Clinical lab tests: ordered and reviewed  Tests in the radiology section of CPT®: ordered and reviewed  Review and summarize past medical records: yes  Independent visualization of images, tracings, or specimens: yes    Risk of Complications, Morbidity, and/or Mortality  Presenting problems: moderate  Diagnostic procedures: moderate  Management options: moderate    Patient Progress  Patient progress: stable         Procedures            Vitals:  Patient Vitals for the past 12 hrs:   Temp Pulse Resp BP SpO2   10/10/22 1523 99 °F (37.2 °C) 75 16 120/75 100 %       Medications ordered:   Medications - No data to display      Lab findings:  Recent Results (from the past 12 hour(s))   CBC WITH AUTOMATED DIFF    Collection Time: 10/10/22  3:35 PM   Result Value Ref Range    WBC 10.0 4.6 - 13.2 K/uL    RBC 3.82 (L) 4.20 - 5.30 M/uL    HGB 11.8 (L) 12.0 - 16.0 g/dL    HCT 34.9 (L) 35.0 - 45.0 %    MCV 91.4 78.0 - 100.0 FL    MCH 30.9 24.0 - 34.0 PG    MCHC 33.8 31.0 - 37.0 g/dL    RDW 13.0 11.6 - 14.5 %    PLATELET 239 748 - 601 K/uL    MPV 10.4 9.2 - 11.8 FL    NRBC 0.0 0  WBC    ABSOLUTE NRBC 0.00 0.00 - 0.01 K/uL    NEUTROPHILS 69 40 - 73 %    LYMPHOCYTES 24 21 - 52 %    MONOCYTES 6 3 - 10 %    EOSINOPHILS 0 0 - 5 %    BASOPHILS 1 0 - 2 %    IMMATURE GRANULOCYTES 0 0.0 - 0.5 %    ABS. NEUTROPHILS 6.8 1.8 - 8.0 K/UL    ABS. LYMPHOCYTES 2.4 0.9 - 3.6 K/UL    ABS. MONOCYTES 0.6 0.05 - 1.2 K/UL    ABS. EOSINOPHILS 0.0 0.0 - 0.4 K/UL    ABS. BASOPHILS 0.1 0.0 - 0.1 K/UL    ABS. IMM. GRANS. 0.0 0.00 - 0.04 K/UL    DF AUTOMATED     METABOLIC PANEL, COMPREHENSIVE    Collection Time: 10/10/22  3:35 PM   Result Value Ref Range    Sodium 139 136 - 145 mmol/L    Potassium 3.7 3.5 - 5.5 mmol/L    Chloride 104 100 - 111 mmol/L    CO2 27 21 - 32 mmol/L    Anion gap 8 3.0 - 18 mmol/L    Glucose 92 74 - 99 mg/dL    BUN 7 7.0 - 18 MG/DL    Creatinine 0.60 0.6 - 1.3 MG/DL    BUN/Creatinine ratio 12 12 - 20      eGFR >60 >60 ml/min/1.73m2    Calcium 9.0 8.5 - 10.1 MG/DL    Bilirubin, total 0.4 0.2 - 1.0 MG/DL    ALT (SGPT) 17 13 - 56 U/L    AST (SGOT) 14 10 - 38 U/L    Alk. phosphatase 60 45 - 117 U/L    Protein, total 7.6 6.4 - 8.2 g/dL    Albumin 4.0 3.4 - 5.0 g/dL    Globulin 3.6 2.0 - 4.0 g/dL    A-G Ratio 1.1 0.8 - 1.7     BETA HCG, QT    Collection Time: 10/10/22  3:35 PM   Result Value Ref Range    Beta HCG,  (H) 0 - 10 MIU/ML   TYPE & SCREEN    Collection Time: 10/10/22  3:35 PM   Result Value Ref Range    Crossmatch Expiration 10/13/2022,2359     ABO/Rh(D) AB POSITIVE     Antibody screen NEG           X-Ray, CT or other radiology findings or impressions:  US OB TV W DOPPLER   Final Result      No intrauterine gestational sac demonstrated. Complex heterogeneous material   within the endometrial canal.   Differential diagnosis would include early IUP less than 4-5 weeks, recent   miscarriage, and ectopic pregnancy. Recommend correlation with quantitative beta hCG and follow-up ultrasound in 2   days as clinically indicated. Disposition:    Diagnosis:   1.  Threatened miscarriage        Disposition: to Home      Follow-up Information       Follow up With Specialties Details Why Contact Info    [facility]    [address]  [address]              Patient's Medications   Start Taking    No medications on file   Continue Taking    ACETAMINOPHEN (TYLENOL EXTRA STRENGTH) 500 MG TABLET    Take 2 Tablets by mouth every six (6) hours as needed for Pain. IBUPROFEN (MOTRIN) 800 MG TABLET    Take 1 Tablet by mouth every six (6) hours as needed for Pain. These Medications have changed    No medications on file   Stop Taking    No medications on file       Return to the ER if you are unable to obtain referral as directed. Clarissa Jacobossing  results have been reviewed with her. She has been counseled regarding her diagnosis, treatment, and plan. She verbally conveys understanding and agreement of the signs, symptoms, diagnosis, treatment and prognosis and additionally agrees to follow up as discussed. She also agrees with the care-plan and conveys that all of her questions have been answered. I have also provided discharge instructions for her that include: educational information regarding their diagnosis and treatment, and list of reasons why they would want to return to the ED prior to their follow-up appointment, should her condition change. Conrado Lo ENP-C,FNP-C      Dragon voice recognition was used to generate this report, which may have resulted in some phonetic based errors in grammar and contents.  Even though attempts were made to correct all the mistakes, some may have been missed, and remained in the body of the document

## 2022-10-10 NOTE — Clinical Note
[facility] EMERGENCY DEPT  [address] [address] [phone]    Work/School Note    Date: 10/10/2022    To Whom It May concern:    Juan Jose Olson was seen and treated today in the emergency room by the following provider(s):  Attending Provider: Celestine Torres MD  Nurse Practitioner: Isa De Santiago NP. Juan Jose Olson is excused from work/school on 10/10/22 and 10/11/22. She is medically clear to return to work/school on 10/12/2022.        Sincerely,          Nirmal Walters NP

## 2022-10-10 NOTE — Clinical Note
[address] [place] [address] [address] [facility] EMERGENCY DEPT  [address] [address] [phone]    Work/School Note    Date: 10/10/2022    To Whom It May concern:    Davie Mora was seen and treated today in the emergency room by the following provider(s):  Attending Provider: Maci Gustafson MD  Nurse Practitioner: Elissa Melendez NP. Davie Mora is excused from work/school on 10/10/22 and 10/11/22. She is medically clear to return to work/school on 10/12/2022.        Sincerely,          Margaux Rachel NP

## 2022-10-10 NOTE — ED TRIAGE NOTES
Pt presenting with ABD pain and spotting following multiple positive pregnancy tests, missed period x approx 6 weeks. Pt bleeding, with severe cramps.  ML

## 2022-10-10 NOTE — ED TRIAGE NOTES
Patient says she is approximately 6 weeks pregnant and is now spotting. Patient also complain of abd discomfort.

## 2022-10-14 ENCOUNTER — HOSPITAL ENCOUNTER (EMERGENCY)
Age: 22
Discharge: HOME | End: 2022-10-14
Attending: EMERGENCY MEDICINE
Payer: MEDICAID

## 2022-10-14 VITALS
SYSTOLIC BLOOD PRESSURE: 114 MMHG | TEMPERATURE: 99.1 F | HEART RATE: 70 BPM | RESPIRATION RATE: 18 BRPM | DIASTOLIC BLOOD PRESSURE: 78 MMHG | OXYGEN SATURATION: 100 %

## 2022-10-14 DIAGNOSIS — Z20.822 COVID-19 VIRUS TEST RESULT UNKNOWN: Primary | ICD-10-CM

## 2022-10-14 LAB
FLUAV RNA SPEC QL NAA+PROBE: NOT DETECTED
FLUBV RNA SPEC QL NAA+PROBE: NOT DETECTED
SARS-COV-2, COV2: NOT DETECTED

## 2022-10-14 PROCEDURE — 99282 EMERGENCY DEPT VISIT SF MDM: CPT

## 2022-10-14 PROCEDURE — 87636 SARSCOV2 & INF A&B AMP PRB: CPT

## 2022-10-14 NOTE — ED PROVIDER NOTES
EMERGENCY DEPARTMENT HISTORY AND PHYSICAL EXAM    Date: 10/14/2022  Patient Name: Desmond Avilez    History of Presenting Illness     Chief Complaint:   Chief Complaint   Patient presents with    Concern For COVID-19 (Coronavirus)     History Provided By: Patient    Additional History (Context): Desmond Avilez is a 20 y.o. female who is 5 wks pregnant c/o loss of taste and smell this morning, denies fevers, chills, sore throat, nasal congestion or rhinorrhea, cough, SOB, wheezing, NVD. Pt is not vaccinated against COVID and is concerned about this dx. Pt denies any abd or vag pressure or pain, notes vag spotting but notes that she was seen by this ER for this 4 days ago and is supposed to f/up with OB-GYN. Visit to ED on 10/10/2022 for threatened miscarriage noted. No new symptoms. Pt declined  hCG recheck. Will establish OB/GYN care. PCP: None    Current Outpatient Medications   Medication Sig Dispense Refill    acetaminophen (Tylenol Extra Strength) 500 mg tablet Take 2 Tablets by mouth every six (6) hours as needed for Pain.  0    ibuprofen (MOTRIN) 800 mg tablet Take 1 Tablet by mouth every six (6) hours as needed for Pain. 20 Tablet 0       Past History     Past Medical History:  Past Medical History:   Diagnosis Date    Asthma     Miscarriage        Past Surgical History:  No past surgical history on file. Family History:  No family history on file. Social History:  Social History     Tobacco Use    Smoking status: Every Day    Smokeless tobacco: Never   Substance Use Topics    Alcohol use: No    Drug use: No       Allergies:  No Known Allergies      Review of Systems   Review of Systems  All Other Systems Negative  10 point review of systems otherwise negative unless noted in HPI. Physical Exam     Vitals:    10/14/22 1234   BP: 114/78   Pulse: 70   Resp: 18   Temp: 99.1 °F (37.3 °C)   SpO2: 100%     Physical Exam  Vitals and nursing note reviewed.    Constitutional:       General: She is not in acute distress. Appearance: She is well-developed. She is not toxic-appearing or diaphoretic. HENT:      Head: Normocephalic and atraumatic. Nose: Nose normal.      Mouth/Throat:      Pharynx: Uvula midline. Cardiovascular:      Rate and Rhythm: Normal rate and regular rhythm. Heart sounds: Normal heart sounds. Pulmonary:      Effort: Pulmonary effort is normal.      Breath sounds: Normal breath sounds. Abdominal:      General: Bowel sounds are normal.      Palpations: Abdomen is soft. Musculoskeletal:         General: Normal range of motion. Cervical back: Normal range of motion and neck supple. Lymphadenopathy:      Cervical: No cervical adenopathy. Skin:     General: Skin is warm and dry. Neurological:      Mental Status: She is alert and oriented to person, place, and time. Diagnostic Study Results     Labs -   No results found for this or any previous visit (from the past 12 hour(s)). Radiologic Studies -   No orders to display     CT Results  (Last 48 hours)      None              Medical Decision Making   I am the first provider for this patient. I reviewed the vital signs, available nursing notes, past medical history, past surgical history, family history and social history. Vital Signs-Reviewed the patient's vital signs. Records Reviewed: Nursing Notes, Old Medical Records, Previous Radiology Studies, and Previous Laboratory Studies    Procedures:  Procedures    Provider Notes (Medical Decision Making):     Well-appearing pt who is 5 mo pregnant with possible threatened miscarriage presents with concerns about scott COVID, has no s/s except for ageusia and anosmia today. COVID and flu tests are neg but pt understands that COVID test result may be false negative as she may be testing too soon. Understands importance of supporitve care, quarantine and f/up (incl return to ED for ny new or worse s/s).    She denies any preg related pregnancy, still has spotting and declines any eval for that. Understands risks, wants to f/ju with OB-GYN. Discussed results and further care, f/u and s/s warranting return to ED. Pt understood and agreed to plan. MED RECONCILIATION:  No current facility-administered medications for this encounter. Current Outpatient Medications   Medication Sig    acetaminophen (Tylenol Extra Strength) 500 mg tablet Take 2 Tablets by mouth every six (6) hours as needed for Pain. ibuprofen (MOTRIN) 800 mg tablet Take 1 Tablet by mouth every six (6) hours as needed for Pain. Disposition:  home    DISCHARGE NOTE:     Pt has been reexamined. Stable. Patient has no new complaints, changes, or physical findings. Care plan outlined and precautions discussed. Results of tests were reviewed with the patient. All medications were reviewed with the patient; will d/c home. All of pt's questions and concerns were addressed. Patient was instructed and agrees to follow up with PCP and OB-GYN, as well as to return to the ED upon further deterioration. Patient is ready to go home. Follow-up Information       Follow up With Specialties Details Why [address] [facility] EMERGENCY DEPT Emergency Medicine  As needed, If symptoms worsen [address]  [phone]    primary care provider    consult your  for contacts            Current Discharge Medication List        CONTINUE these medications which have NOT CHANGED    Details   acetaminophen (Tylenol Extra Strength) 500 mg tablet Take 2 Tablets by mouth every six (6) hours as needed for Pain. Refills: 0      ibuprofen (MOTRIN) 800 mg tablet Take 1 Tablet by mouth every six (6) hours as needed for Pain. Qty: 20 Tablet, Refills: 0                 Diagnosis     Clinical Impression:   1.  COVID-19 virus test result unknown          Dictation disclaimer:  Please note that this dictation was completed with kera Rai computer voice recognition software. Quite often unanticipated grammatical, syntax, homophones, and other interpretive errors are inadvertently transcribed by the computer software. Please disregard these errors. Please excuse any errors that have escaped final proofreading.

## 2022-10-14 NOTE — ED TRIAGE NOTES
States woke up this morning unable to taste or smell, states unvaccinated for COVID and concern for COVID.  Denies SOB or chest pain

## 2022-10-14 NOTE — ED NOTES
Patient discharged to home. No IV placed. All personal belongings given to patient. Escorted patient to ER exit.

## 2022-10-14 NOTE — LETTER
[address] [place] [address] [address] [facility] EMERGENCY DEPT  [address] [address] [phone]    Work/School Note    Date: 10/14/2022     To Whom It May concern:    Ok Chon was evaluated by the following provider(s):  Attending Provider: Roney Saenz DO  Physician Assistant: Jeff Moctezuma, [address] virus is suspected. Per the CDC guidelines we recommend home isolation until the following conditions are all met:    1. At least five days have passed since symptoms first appeared and/or had a close exposure,   2. After home isolation for five days, wearing a mask around others for the next five days,  3. At least 24 have passed since last fever without the use of fever-reducing medications and  4.  Symptoms (eg cough, shortness of breath) have improved    HESHAM Matthew   Sincerely,

## 2022-11-10 ENCOUNTER — HOSPITAL ENCOUNTER (EMERGENCY)
Age: 22
Discharge: STILL ADMITTED OR ADMINISTRATIVE | End: 2022-11-10
Attending: EMERGENCY MEDICINE

## 2022-11-11 ENCOUNTER — HOSPITAL ENCOUNTER (EMERGENCY)
Age: 22
Discharge: HOME | End: 2022-11-11
Attending: EMERGENCY MEDICINE
Payer: MEDICAID

## 2022-11-11 VITALS
SYSTOLIC BLOOD PRESSURE: 121 MMHG | TEMPERATURE: 99 F | HEART RATE: 73 BPM | OXYGEN SATURATION: 100 % | DIASTOLIC BLOOD PRESSURE: 74 MMHG | RESPIRATION RATE: 18 BRPM

## 2022-11-11 DIAGNOSIS — N89.8 VAGINAL DISCHARGE: ICD-10-CM

## 2022-11-11 DIAGNOSIS — N76.0 BV (BACTERIAL VAGINOSIS): ICD-10-CM

## 2022-11-11 DIAGNOSIS — B96.89 BV (BACTERIAL VAGINOSIS): ICD-10-CM

## 2022-11-11 DIAGNOSIS — A59.01 TRICHOMONAS VAGINITIS: Primary | ICD-10-CM

## 2022-11-11 LAB
APPEARANCE UR: ABNORMAL
BACTERIA URNS QL MICRO: ABNORMAL /HPF
BILIRUB UR QL: NEGATIVE
C TRACH RRNA SPEC QL NAA+PROBE: NEGATIVE
COLOR UR: YELLOW
EPITH CASTS URNS QL MICRO: ABNORMAL /LPF (ref 0–5)
GLUCOSE UR STRIP.AUTO-MCNC: NEGATIVE MG/DL
HCG UR QL: NEGATIVE
HGB UR QL STRIP: NEGATIVE
KETONES UR QL STRIP.AUTO: NEGATIVE MG/DL
LEUKOCYTE ESTERASE UR QL STRIP.AUTO: ABNORMAL
N GONORRHOEA RRNA SPEC QL NAA+PROBE: NEGATIVE
NITRITE UR QL STRIP.AUTO: NEGATIVE
PH UR STRIP: 6.5 [PH] (ref 5–8)
PROT UR STRIP-MCNC: NEGATIVE MG/DL
RBC #/AREA URNS HPF: NEGATIVE /HPF (ref 0–5)
SERVICE CMNT-IMP: NORMAL
SP GR UR REFRACTOMETRY: 1.02 (ref 1–1.03)
SPECIMEN SOURCE: NORMAL
UROBILINOGEN UR QL STRIP.AUTO: 0.2 EU/DL (ref 0.2–1)
WBC URNS QL MICRO: ABNORMAL /HPF (ref 0–4)
WET PREP GENITAL: NORMAL

## 2022-11-11 PROCEDURE — 99283 EMERGENCY DEPT VISIT LOW MDM: CPT

## 2022-11-11 PROCEDURE — 87210 SMEAR WET MOUNT SALINE/INK: CPT

## 2022-11-11 PROCEDURE — 81025 URINE PREGNANCY TEST: CPT

## 2022-11-11 PROCEDURE — 87491 CHLMYD TRACH DNA AMP PROBE: CPT

## 2022-11-11 PROCEDURE — 81001 URINALYSIS AUTO W/SCOPE: CPT

## 2022-11-11 RX ORDER — METRONIDAZOLE 500 MG/1
500 TABLET ORAL 2 TIMES DAILY
Qty: 20 TABLET | Refills: 0 | Status: SHIPPED | OUTPATIENT
Start: 2022-11-11 | End: 2022-11-21

## 2022-11-11 NOTE — ED PROVIDER NOTES
EMERGENCY DEPARTMENT HISTORY AND PHYSICAL EXAM    Date: 11/11/2022  Patient Name: Sari Johnson    History of Presenting Illness     Chief Complaint   Patient presents with    Vaginal Discharge         History Provided By: Patient    Chief Complaint: Vaginal discharge  Duration: Couple days  Timing: Acute  Location: Vagina  Quality: Clear  Severity: Moderate  Modifying Factors: None  Associated Symptoms: none       Additional History (Context): Sari Johnson is a 20 y.o. female with a history of asthma and miscarriage who presents today for issues listed above. Patient reports she has had a clear vaginal discharge for the past couple days. Reports it is now getting into her underwear. Denies concern for STDs but would like to be screened. Has not tried thing for this at home. PCP: None    Current Outpatient Medications   Medication Sig Dispense Refill    metroNIDAZOLE (FlagyL) 500 mg tablet Take 1 Tablet by mouth two (2) times a day for 10 days. 20 Tablet 0    acetaminophen (Tylenol Extra Strength) 500 mg tablet Take 2 Tablets by mouth every six (6) hours as needed for Pain.  0    ibuprofen (MOTRIN) 800 mg tablet Take 1 Tablet by mouth every six (6) hours as needed for Pain. 20 Tablet 0       Past History     Past Medical History:  Past Medical History:   Diagnosis Date    Asthma     Miscarriage        Past Surgical History:  No past surgical history on file. Family History:  No family history on file. Social History:  Social History     Tobacco Use    Smoking status: Every Day    Smokeless tobacco: Never   Substance Use Topics    Alcohol use: No    Drug use: No       Allergies:  No Known Allergies      Review of Systems   Review of Systems   Constitutional:  Negative for chills and fever. HENT:  Negative for congestion, rhinorrhea and sore throat. Respiratory:  Negative for cough and shortness of breath. Cardiovascular:  Negative for chest pain.    Gastrointestinal:  Negative for abdominal pain, blood in stool, constipation, diarrhea, nausea and vomiting. Genitourinary:  Positive for vaginal discharge. Negative for dysuria, frequency and hematuria. Musculoskeletal:  Negative for back pain and myalgias. Skin:  Negative for rash and wound. Neurological:  Negative for dizziness and headaches. All other systems reviewed and are negative. All Other Systems Negative  Physical Exam     Vitals:    11/11/22 0932   BP: 121/74   Pulse: 73   Resp: 18   Temp: 99 °F (37.2 °C)   SpO2: 100%     Physical Exam  Vitals and nursing note reviewed. Constitutional:       General: She is not in acute distress. Appearance: She is well-developed. She is not diaphoretic. HENT:      Head: Normocephalic and atraumatic. Eyes:      Conjunctiva/sclera: Conjunctivae normal.   Cardiovascular:      Rate and Rhythm: Normal rate and regular rhythm. Heart sounds: Normal heart sounds. Pulmonary:      Effort: Pulmonary effort is normal. No respiratory distress. Breath sounds: Normal breath sounds. Chest:      Chest wall: No tenderness. Abdominal:      General: Bowel sounds are normal. There is no distension. Palpations: Abdomen is soft. Tenderness: There is no abdominal tenderness. There is no guarding or rebound. Genitourinary:     Comments: Patient will self swab  Musculoskeletal:         General: No deformity. Cervical back: Normal range of motion and neck supple. Skin:     General: Skin is warm and dry. Neurological:      Mental Status: She is alert and oriented to person, place, and time. Deep Tendon Reflexes: Reflexes are normal and symmetric.          Diagnostic Study Results     Labs -     Recent Results (from the past 12 hour(s))   URINALYSIS W/ RFLX MICROSCOPIC    Collection Time: 11/11/22  9:31 AM   Result Value Ref Range    Color YELLOW      Appearance CLOUDY      Specific gravity 1.023 1.005 - 1.030      pH (UA) 6.5 5.0 - 8.0      Protein Negative NEG mg/dL    Glucose Negative NEG mg/dL    Ketone Negative NEG mg/dL    Bilirubin Negative NEG      Blood Negative NEG      Urobilinogen 0.2 0.2 - 1.0 EU/dL    Nitrites Negative NEG      Leukocyte Esterase LARGE (A) NEG     WET PREP    Collection Time: 11/11/22  9:31 AM    Specimen: Vagina   Result Value Ref Range    Special Requests: NO SPECIAL REQUESTS      Wet prep FEW  CLUE CELLS PRESENT        Wet prep NO YEAST SEEN      Wet prep MOTILE TRICHOMONAS NOTED     HCG URINE, QL    Collection Time: 11/11/22  9:31 AM   Result Value Ref Range    HCG urine, QL Negative NEG     URINE MICROSCOPIC ONLY    Collection Time: 11/11/22  9:31 AM   Result Value Ref Range    WBC 6 to 8 0 - 4 /hpf    RBC Negative 0 - 5 /hpf    Epithelial cells 4+ 0 - 5 /lpf    Bacteria 3+ (A) NEG /hpf       Radiologic Studies -   No orders to display     CT Results  (Last 48 hours)      None          CXR Results  (Last 48 hours)      None              Medical Decision Making   I am the first provider for this patient. I reviewed the vital signs, available nursing notes, past medical history, past surgical history, family history and social history. Vital Signs-Reviewed the patient's vital signs. Records Reviewed: Nursing Notes and Old Medical Records     Procedures: None   Procedures    Provider Notes (Medical Decision Making):     Differential Diagnosis:  Candidiasis, trichomoniasis, bacterial vaginitis, GC/Chlamydia, pregnancy, urethritis/UTI, vaginal foreign body, atrophic vaginitis, contact vulvovaginitis, physiologic discharge    Plan: Will order ua, urine preg, patient will self swab with wet prep and gc swab. 11:47 AM  Have discussed findings of trichomonas and BV with the patient. Will discharge home with metronidazole and have encouraged no alcohol while taking this medication. Have also encourage patient to have any and all partners tested and treated as trichomonas is an STD. Have encouraged close OB/GYN follow-up. Will discharge home. MED RECONCILIATION:  No current facility-administered medications for this encounter. Current Outpatient Medications   Medication Sig    metroNIDAZOLE (FlagyL) 500 mg tablet Take 1 Tablet by mouth two (2) times a day for 10 days. acetaminophen (Tylenol Extra Strength) 500 mg tablet Take 2 Tablets by mouth every six (6) hours as needed for Pain. ibuprofen (MOTRIN) 800 mg tablet Take 1 Tablet by mouth every six (6) hours as needed for Pain. Disposition:  Home     DISCHARGE NOTE:   Pt has been reexamined. Patient has no new complaints, changes, or physical findings. Care plan outlined and precautions discussed. Results of workup were reviewed with the patient. All medications were reviewed with the patient. All of pt's questions and concerns were addressed. Patient was instructed and agrees to follow up with PCP/OB/GYN as well as to return to the ED upon further deterioration. Patient is ready to go home. Follow-up Information       Follow up With Specialties Details Why Contact Info    [facility] Emergency Medicine  As needed [address] [place] [address] [address] [address]  [address]  Schedule an appointment as soon as possible for a visit   [address] [place] [address]  [phone]            Current Discharge Medication List        START taking these medications    Details   metroNIDAZOLE (FlagyL) 500 mg tablet Take 1 Tablet by mouth two (2) times a day for 10 days. Qty: 20 Tablet, Refills: 0  Start date: 11/11/2022, End date: 11/21/2022                 Diagnosis     Clinical Impression:   1. Trichomonas vaginitis    2. BV (bacterial vaginosis)    3. Vaginal discharge          \"Please note that this dictation was completed with [employer], the [employer] voice recognition software. Quite often unanticipated grammatical, syntax, homophones, and other interpretive errors are inadvertently transcribed by the computer software.  Please disregard these errors. Please excuse any errors that have escaped final proofreading. \"

## 2022-11-11 NOTE — Clinical Note
[facility] EMERGENCY DEPT  [id number] [address] [phone]    Work/School Note    Date: 11/11/2022    To Whom It May concern:      Tino White was seen and treated today in the emergency room by the following provider(s):  Attending Provider: Rosendo Carey MD  Physician Assistant: HESHAM Robertson. Tino White is excused from work/school on 11/11/22. She is clear to return to work/school on 11/12/22.         Sincerely,          HESHAM Rand

## 2022-11-11 NOTE — Clinical Note
[address] [place] [address] Dr [facility] EMERGENCY DEPT  [address] [address] [phone]    Work/School Note    Date: 11/11/2022    To Whom It May concern:      Brunilda Leavitt was seen and treated today in the emergency room by the following provider(s):  Attending Provider: Zee Nagel MD  Physician Assistant: HESHAM Rivera. Brunilda Leavitt is excused from work/school on 11/11/22. She is clear to return to work/school on 11/12/22.         Sincerely,          HESHAM Wu

## 2022-12-04 ENCOUNTER — HOSPITAL ENCOUNTER (EMERGENCY)
Age: 22
Discharge: HOME | End: 2022-12-04
Attending: EMERGENCY MEDICINE
Payer: MEDICAID

## 2022-12-04 VITALS
HEART RATE: 66 BPM | RESPIRATION RATE: 16 BRPM | DIASTOLIC BLOOD PRESSURE: 76 MMHG | SYSTOLIC BLOOD PRESSURE: 141 MMHG | OXYGEN SATURATION: 100 % | TEMPERATURE: 98.1 F

## 2022-12-04 DIAGNOSIS — B96.89 BV (BACTERIAL VAGINOSIS): Primary | ICD-10-CM

## 2022-12-04 DIAGNOSIS — N76.0 BV (BACTERIAL VAGINOSIS): Primary | ICD-10-CM

## 2022-12-04 LAB
APPEARANCE UR: ABNORMAL
BACTERIA URNS QL MICRO: ABNORMAL /HPF
BILIRUB UR QL: NEGATIVE
COLOR UR: YELLOW
EPITH CASTS URNS QL MICRO: ABNORMAL /LPF (ref 0–5)
GLUCOSE UR STRIP.AUTO-MCNC: NEGATIVE MG/DL
HCG UR QL: NEGATIVE
HGB UR QL STRIP: NEGATIVE
KETONES UR QL STRIP.AUTO: ABNORMAL MG/DL
LEUKOCYTE ESTERASE UR QL STRIP.AUTO: ABNORMAL
NITRITE UR QL STRIP.AUTO: NEGATIVE
PH UR STRIP: 7.5 [PH] (ref 5–8)
PROT UR STRIP-MCNC: NEGATIVE MG/DL
RBC #/AREA URNS HPF: NEGATIVE /HPF (ref 0–5)
SERVICE CMNT-IMP: NORMAL
SP GR UR REFRACTOMETRY: 1.03 (ref 1–1.03)
UROBILINOGEN UR QL STRIP.AUTO: 1 EU/DL (ref 0.2–1)
WBC URNS QL MICRO: ABNORMAL /HPF (ref 0–4)
WET PREP GENITAL: NORMAL

## 2022-12-04 PROCEDURE — 87491 CHLMYD TRACH DNA AMP PROBE: CPT

## 2022-12-04 PROCEDURE — 81025 URINE PREGNANCY TEST: CPT

## 2022-12-04 PROCEDURE — 99283 EMERGENCY DEPT VISIT LOW MDM: CPT

## 2022-12-04 PROCEDURE — 81001 URINALYSIS AUTO W/SCOPE: CPT

## 2022-12-04 PROCEDURE — 87210 SMEAR WET MOUNT SALINE/INK: CPT

## 2022-12-04 RX ORDER — METRONIDAZOLE 500 MG/1
500 TABLET ORAL 2 TIMES DAILY
Qty: 14 TABLET | Refills: 0 | Status: SHIPPED | OUTPATIENT
Start: 2022-12-04 | End: 2022-12-11

## 2022-12-04 NOTE — ED PROVIDER NOTES
EMERGENCY DEPARTMENT HISTORY AND PHYSICAL EXAM    3:34 PM      Date: 12/4/2022  Patient Name: Annette Sheldon    History of Presenting Illness     Chief Complaint   Patient presents with    Vaginal Discharge     Thinks she may have BV         History Provided By: Patient    Additional History (Context): Annette Sheldon is a 20 y.o. female with  noted PMH  who presents with complaint of vaginal discharge x2 days. Patient is concerned for recurrent bacterial vaginosis. Denies fever or chills, abdominal pain, nausea or vomiting, dysuria, hematuria. Denies concern for STDs. Denies exacerbating or alleviating factors. PCP: None    Current Outpatient Medications   Medication Sig Dispense Refill    metroNIDAZOLE (FlagyL) 500 mg tablet Take 1 Tablet by mouth two (2) times a day for 7 days. 14 Tablet 0       Past History     Past Medical History:  Past Medical History:   Diagnosis Date    Asthma     Miscarriage        Past Surgical History:  No past surgical history on file. Family History:  No family history on file. Social History:  Social History     Tobacco Use    Smoking status: Every Day    Smokeless tobacco: Never   Substance Use Topics    Alcohol use: No    Drug use: No       Allergies:  No Known Allergies      Review of Systems       Review of Systems   Constitutional:  Negative for chills and fever. Respiratory:  Negative for shortness of breath. Cardiovascular:  Negative for chest pain. Gastrointestinal:  Negative for abdominal pain, nausea and vomiting. Genitourinary:  Positive for vaginal discharge. Skin:  Negative for rash. Neurological:  Negative for weakness. All other systems reviewed and are negative. Physical Exam   Visit Vitals  BP (!) 141/76   Pulse 66   Temp 98.1 °F (36.7 °C)   Resp 16   SpO2 100%         Physical Exam  Vitals and nursing note reviewed. Constitutional:       General: She is not in acute distress. Appearance: Normal appearance. She is well-developed.  She is not ill-appearing, toxic-appearing or diaphoretic. HENT:      Head: Normocephalic and atraumatic. Cardiovascular:      Rate and Rhythm: Normal rate and regular rhythm. Heart sounds: Normal heart sounds. No murmur heard. No friction rub. No gallop. Pulmonary:      Effort: Pulmonary effort is normal. No respiratory distress. Breath sounds: Normal breath sounds. No wheezing or rales. Abdominal:      General: Abdomen is flat. There is no distension. Palpations: Abdomen is soft. Tenderness: There is no abdominal tenderness. There is no right CVA tenderness, left CVA tenderness, guarding or rebound. Genitourinary:     Comments: Self swabs   Musculoskeletal:         General: Normal range of motion. Cervical back: Normal range of motion and neck supple. Skin:     General: Skin is warm. Findings: No rash. Neurological:      Mental Status: She is alert.          Diagnostic Study Results     Labs -  Recent Results (from the past 12 hour(s))   WET PREP    Collection Time: 12/04/22  4:20 PM    Specimen: Vagina   Result Value Ref Range    Special Requests: NO SPECIAL REQUESTS      Wet prep RARE  CLUE CELLS PRESENT        Wet prep NO TRICHOMONAS SEEN      Wet prep NO YEAST SEEN     URINALYSIS W/ RFLX MICROSCOPIC    Collection Time: 12/04/22  4:30 PM   Result Value Ref Range    Color YELLOW      Appearance CLOUDY      Specific gravity 1.030 1.005 - 1.030      pH (UA) 7.5 5.0 - 8.0      Protein Negative NEG mg/dL    Glucose Negative NEG mg/dL    Ketone TRACE (A) NEG mg/dL    Bilirubin Negative NEG      Blood Negative NEG      Urobilinogen 1.0 0.2 - 1.0 EU/dL    Nitrites Negative NEG      Leukocyte Esterase SMALL (A) NEG     HCG URINE, QL    Collection Time: 12/04/22  4:30 PM   Result Value Ref Range    HCG urine, QL Negative NEG     URINE MICROSCOPIC ONLY    Collection Time: 12/04/22  4:30 PM   Result Value Ref Range    WBC 1 to 3 0 - 4 /hpf    RBC Negative 0 - 5 /hpf    Epithelial cells 3+ 0 - 5 /lpf    Bacteria 1+ (A) NEG /hpf       Radiologic Studies -   No orders to display         Medical Decision Making   I am the first provider for this patient. I reviewed the vital signs, available nursing notes, past medical history, past surgical history, family history and social history. Vital Signs-Reviewed the patient's vital signs. Records Reviewed: Nursing Notes and Old Medical Records (Time of Review: 3:34 PM)    ED Course: Progress Notes, Reevaluation, and Consults:  5:30 PM: Reviewed results and plan with patient. Discussed need for close outpatient follow-up this week for reassessment. Discussed strict return precautions, including abdominal pain, vomiting, or any other medical concerns. Provider Notes (Medical Decision Making): 24-year-old female who presents to the ED due to vaginal discharge. Abdomen soft and nontender to palpation. hCG negative, wet prep demonstrates bacterial vaginosis. Culture sent and pending. Patient stable for discharge and close outpatient follow-up for further assessment. Strict return precautions provided      Diagnosis     Clinical Impression:   1. BV (bacterial vaginosis)        Disposition: home     Follow-up Information       Follow up With Specialties Details Why [facility]    [facility] EMERGENCY DEPT Emergency Medicine  If symptoms worsen [address] [address]  [address]  Schedule an appointment as soon as possible for a visit   Jose Hong [address]  [address]             Discharge Medication List as of 12/4/2022  5:30 PM        START taking these medications    Details   metroNIDAZOLE (FlagyL) 500 mg tablet Take 1 Tablet by mouth two (2) times a day for 7 days. , Normal, Disp-14 Tablet, R-0           STOP taking these medications       acetaminophen (Tylenol Extra Strength) 500 mg tablet Comments:   Reason for Stopping:         ibuprofen (MOTRIN) 800 mg tablet Comments:   Reason for Stopping:               Dictation disclaimer:  Please note that this dictation was completed with [employer], the computer voice recognition software. Quite often unanticipated grammatical, syntax, homophones, and other interpretive errors are inadvertently transcribed by the computer software. Please disregard these errors. Please excuse any errors that have escaped final proofreading.

## 2023-01-07 ENCOUNTER — HOSPITAL ENCOUNTER (EMERGENCY)
Age: 23
Discharge: HOME | End: 2023-01-07
Attending: STUDENT IN AN ORGANIZED HEALTH CARE EDUCATION/TRAINING PROGRAM
Payer: MEDICAID

## 2023-01-07 VITALS
SYSTOLIC BLOOD PRESSURE: 111 MMHG | RESPIRATION RATE: 16 BRPM | DIASTOLIC BLOOD PRESSURE: 64 MMHG | HEART RATE: 64 BPM | OXYGEN SATURATION: 100 % | TEMPERATURE: 98.7 F

## 2023-01-07 DIAGNOSIS — B37.9 YEAST INFECTION: Primary | ICD-10-CM

## 2023-01-07 DIAGNOSIS — B96.89 BV (BACTERIAL VAGINOSIS): ICD-10-CM

## 2023-01-07 DIAGNOSIS — N76.0 BV (BACTERIAL VAGINOSIS): ICD-10-CM

## 2023-01-07 LAB
APPEARANCE UR: CLEAR
BACTERIA URNS QL MICRO: ABNORMAL /HPF
BILIRUB UR QL: NEGATIVE
COLOR UR: YELLOW
EPITH CASTS URNS QL MICRO: ABNORMAL /LPF (ref 0–5)
GLUCOSE UR STRIP.AUTO-MCNC: NEGATIVE MG/DL
HCG UR QL: NEGATIVE
HGB UR QL STRIP: NEGATIVE
KETONES UR QL STRIP.AUTO: NEGATIVE MG/DL
LEUKOCYTE ESTERASE UR QL STRIP.AUTO: ABNORMAL
NITRITE UR QL STRIP.AUTO: NEGATIVE
PH UR STRIP: 7 (ref 5–8)
PROT UR STRIP-MCNC: NEGATIVE MG/DL
RBC #/AREA URNS HPF: ABNORMAL /HPF (ref 0–5)
SERVICE CMNT-IMP: NORMAL
SP GR UR REFRACTOMETRY: 1.02 (ref 1–1.03)
UROBILINOGEN UR QL STRIP.AUTO: 0.2 EU/DL (ref 0.2–1)
WBC URNS QL MICRO: ABNORMAL /HPF (ref 0–4)
WET PREP GENITAL: NORMAL

## 2023-01-07 PROCEDURE — 99283 EMERGENCY DEPT VISIT LOW MDM: CPT

## 2023-01-07 PROCEDURE — 87491 CHLMYD TRACH DNA AMP PROBE: CPT

## 2023-01-07 PROCEDURE — 81025 URINE PREGNANCY TEST: CPT

## 2023-01-07 PROCEDURE — 81001 URINALYSIS AUTO W/SCOPE: CPT

## 2023-01-07 PROCEDURE — 87210 SMEAR WET MOUNT SALINE/INK: CPT

## 2023-01-07 RX ORDER — FLUCONAZOLE 150 MG/1
150 TABLET ORAL
Qty: 1 TABLET | Refills: 0 | Status: SHIPPED | OUTPATIENT
Start: 2023-01-07 | End: 2023-01-07

## 2023-01-07 RX ORDER — METRONIDAZOLE 500 MG/1
500 TABLET ORAL 2 TIMES DAILY
Qty: 14 TABLET | Refills: 0 | Status: SHIPPED | OUTPATIENT
Start: 2023-01-07 | End: 2023-01-14

## 2023-01-07 NOTE — ED TRIAGE NOTES
Pt arrived with c/o of vaginal discharge for the past two days. Pt denies any pain or burning with urination.

## 2023-01-07 NOTE — ED PROVIDER NOTES
EMERGENCY DEPARTMENT HISTORY AND PHYSICAL EXAM    9:37 AM      Date: 1/7/2023  Patient Name: Baljit Jeffers    History of Presenting Illness     Chief Complaint   Patient presents with    Vaginal Discharge       History Provided By: Patient    Additional History (Context): Baljit Jeffers is a 20 y.o. female with  hx of asthma, bacterial vaginosis, and other noted PMH  who presents with c/o vaginal discharge x 2 days. Pt notes concern for bacterial vaginosis. Pt denies fever/chills, abdominal pain, dysuria, hematuria, vaginal bleeding. Denies concern for STIs. Denies exacerbating or alleviating factors. PCP: None      Past History     Past Medical History:  Past Medical History:   Diagnosis Date    Asthma     Miscarriage        Past Surgical History:  No past surgical history on file. Family History:  No family history on file. Social History:  Social History     Tobacco Use    Smoking status: Every Day    Smokeless tobacco: Never   Substance Use Topics    Alcohol use: No    Drug use: No       Allergies:  No Known Allergies      Review of Systems       Review of Systems   Constitutional:  Negative for chills and fever. Respiratory:  Negative for shortness of breath. Cardiovascular:  Negative for chest pain. Gastrointestinal:  Negative for abdominal pain, nausea and vomiting. Genitourinary:  Positive for vaginal discharge. Skin:  Negative for rash. Neurological:  Negative for weakness. All other systems reviewed and are negative. Physical Exam   Visit Vitals  /64   Pulse 64   Temp 98.7 °F (37.1 °C)   Resp 16   SpO2 100%         Physical Exam  Vitals and nursing note reviewed. Constitutional:       General: She is not in acute distress. Appearance: Normal appearance. She is well-developed. She is not ill-appearing, toxic-appearing or diaphoretic. HENT:      Head: Normocephalic and atraumatic. Cardiovascular:      Rate and Rhythm: Normal rate and regular rhythm.       Heart sounds: Normal heart sounds. No murmur heard. No friction rub. No gallop. Pulmonary:      Effort: Pulmonary effort is normal. No respiratory distress. Breath sounds: Normal breath sounds. No wheezing or rales. Abdominal:      General: Abdomen is flat. There is no distension. Palpations: Abdomen is soft. Tenderness: There is no abdominal tenderness. There is no right CVA tenderness, left CVA tenderness, guarding or rebound. Genitourinary:     Comments: Self swabs   Musculoskeletal:         General: Normal range of motion. Cervical back: Normal range of motion and neck supple. Skin:     General: Skin is warm. Findings: No rash. Neurological:      Mental Status: She is alert. Diagnostic Study Results     Labs -  Recent Results (from the past 12 hour(s))   URINALYSIS W/ RFLX MICROSCOPIC    Collection Time: 01/07/23  9:29 AM   Result Value Ref Range    Color YELLOW      Appearance CLEAR      Specific gravity 1.017 1.005 - 1.030      pH (UA) 7.0 5.0 - 8.0      Protein Negative NEG mg/dL    Glucose Negative NEG mg/dL    Ketone Negative NEG mg/dL    Bilirubin Negative NEG      Blood Negative NEG      Urobilinogen 0.2 0.2 - 1.0 EU/dL    Nitrites Negative NEG      Leukocyte Esterase LARGE (A) NEG     HCG URINE, QL    Collection Time: 01/07/23  9:29 AM   Result Value Ref Range    HCG urine, QL Negative NEG     URINE MICROSCOPIC ONLY    Collection Time: 01/07/23  9:29 AM   Result Value Ref Range    WBC 6 to 8 0 - 4 /hpf    RBC NONE 0 - 5 /hpf    Epithelial cells 3+ 0 - 5 /lpf    Bacteria FEW (A) NEG /hpf   WET PREP    Collection Time: 01/07/23  9:34 AM    Specimen: Vagina   Result Value Ref Range    Special Requests: NO SPECIAL REQUESTS      Wet prep MODERATE  YEAST        Wet prep MODERATE  CLUE CELLS PRESENT        Wet prep NO TRICHOMONAS SEEN         Radiologic Studies -   No orders to display         Medical Decision Making   I am the first provider for this patient.     I reviewed the vital signs, available nursing notes, past medical history, past surgical history, family history and social history. Vital Signs-Reviewed the patient's vital signs. Records Reviewed: Nursing Notes and Old Medical Records (Time of Review: 9:37 AM)    ED Course: Progress Notes, Reevaluation, and Consults:  10:05 AM: Reviewed results and plan with patient. Discussed need for close outpatient follow-up this week for reassessment. Discussed strict return precautions, including abdominal pain, vomiting, or any other medical concerns. Provider Notes (Medical Decision Making): 69-year-old female who presents to the ED due to vaginal discharge. Abdomen soft and nontender palpation. hCG negative, wet prep demonstrates moderate yeast and moderate clue cells. Patient is stable for discharge with antibiotics and close outpatient follow-up for further assessment. Strict return precautions provided      Diagnosis     Clinical Impression:   1. Yeast infection    2. BV (bacterial vaginosis)        Disposition: home     Follow-up Information       Follow up With Specialties Details Why [address] [facility] EMERGENCY DEPT Emergency Medicine  If symptoms worsen [address] [place] [id number]  [address]ory [address]  Schedule an appointment as soon as possible for a visit   [address] [place] [address]  [phone]             Discharge Medication List as of 1/7/2023 10:05 AM        START taking these medications    Details   fluconazole (Diflucan) 150 mg tablet Take 1 Tablet by mouth now for 1 dose. FDA advises cautious prescribing of oral fluconazole in pregnancy. , Normal, Disp-1 Tablet, R-0      metroNIDAZOLE (FlagyL) 500 mg tablet Take 1 Tablet by mouth two (2) times a day for 7 days. , Normal, Disp-14 Tablet, R-0             Dictation disclaimer:  Please note that this dictation was completed with [employer], the computer voice recognition software. Quite often unanticipated grammatical, syntax, homophones, and other interpretive errors are inadvertently transcribed by the computer software. Please disregard these errors. Please excuse any errors that have escaped final proofreading.

## 2023-02-02 ENCOUNTER — HOSPITAL ENCOUNTER (EMERGENCY)
Age: 23
Discharge: HOME | End: 2023-02-02
Attending: EMERGENCY MEDICINE
Payer: MEDICAID

## 2023-02-02 VITALS
TEMPERATURE: 98.6 F | DIASTOLIC BLOOD PRESSURE: 63 MMHG | OXYGEN SATURATION: 100 % | RESPIRATION RATE: 16 BRPM | HEART RATE: 67 BPM | SYSTOLIC BLOOD PRESSURE: 120 MMHG

## 2023-02-02 DIAGNOSIS — N39.0 ACUTE UTI: ICD-10-CM

## 2023-02-02 DIAGNOSIS — B96.89 BV (BACTERIAL VAGINOSIS): ICD-10-CM

## 2023-02-02 DIAGNOSIS — Z20.2 POSSIBLE EXPOSURE TO STD: Primary | ICD-10-CM

## 2023-02-02 DIAGNOSIS — B37.41 CANDIDA CYSTITIS: ICD-10-CM

## 2023-02-02 DIAGNOSIS — N76.0 BV (BACTERIAL VAGINOSIS): ICD-10-CM

## 2023-02-02 LAB
APPEARANCE UR: ABNORMAL
BACTERIA URNS QL MICRO: ABNORMAL /HPF
BILIRUB UR QL: NEGATIVE
COLOR UR: YELLOW
EPITH CASTS URNS QL MICRO: ABNORMAL /LPF (ref 0–5)
GLUCOSE UR STRIP.AUTO-MCNC: NEGATIVE MG/DL
HCG UR QL: NEGATIVE
HGB UR QL STRIP: NEGATIVE
KETONES UR QL STRIP.AUTO: ABNORMAL MG/DL
LEUKOCYTE ESTERASE UR QL STRIP.AUTO: ABNORMAL
MUCOUS THREADS URNS QL MICRO: ABNORMAL /LPF
NITRITE UR QL STRIP.AUTO: NEGATIVE
PH UR STRIP: 5.5 (ref 5–8)
PROT UR STRIP-MCNC: NEGATIVE MG/DL
RBC #/AREA URNS HPF: ABNORMAL /HPF (ref 0–5)
SERVICE CMNT-IMP: NORMAL
SP GR UR REFRACTOMETRY: 1.03 (ref 1–1.03)
UROBILINOGEN UR QL STRIP.AUTO: 1 EU/DL (ref 0.2–1)
WBC URNS QL MICRO: ABNORMAL /HPF (ref 0–4)
WET PREP GENITAL: NORMAL
YEAST URNS QL MICRO: ABNORMAL

## 2023-02-02 PROCEDURE — 96372 THER/PROPH/DIAG INJ SC/IM: CPT

## 2023-02-02 PROCEDURE — 74011250637 HC RX REV CODE- 250/637: Performed by: EMERGENCY MEDICINE

## 2023-02-02 PROCEDURE — 74011000250 HC RX REV CODE- 250: Performed by: EMERGENCY MEDICINE

## 2023-02-02 PROCEDURE — 81001 URINALYSIS AUTO W/SCOPE: CPT

## 2023-02-02 PROCEDURE — 81025 URINE PREGNANCY TEST: CPT

## 2023-02-02 PROCEDURE — 87491 CHLMYD TRACH DNA AMP PROBE: CPT

## 2023-02-02 PROCEDURE — 87210 SMEAR WET MOUNT SALINE/INK: CPT

## 2023-02-02 PROCEDURE — 74011250636 HC RX REV CODE- 250/636: Performed by: EMERGENCY MEDICINE

## 2023-02-02 PROCEDURE — 99284 EMERGENCY DEPT VISIT MOD MDM: CPT

## 2023-02-02 RX ORDER — AZITHROMYCIN 250 MG/1
1000 TABLET, FILM COATED ORAL
Status: COMPLETED | OUTPATIENT
Start: 2023-02-02 | End: 2023-02-02

## 2023-02-02 RX ORDER — FLUCONAZOLE 150 MG/1
150 TABLET ORAL ONCE
Qty: 1 TABLET | Refills: 0 | Status: SHIPPED | OUTPATIENT
Start: 2023-02-02 | End: 2023-02-02

## 2023-02-02 RX ORDER — METRONIDAZOLE 500 MG/1
500 TABLET ORAL 2 TIMES DAILY
Qty: 14 TABLET | Refills: 0 | Status: SHIPPED | OUTPATIENT
Start: 2023-02-02 | End: 2023-02-09

## 2023-02-02 RX ORDER — ONDANSETRON 8 MG/1
8 TABLET, ORALLY DISINTEGRATING ORAL
Status: COMPLETED | OUTPATIENT
Start: 2023-02-02 | End: 2023-02-02

## 2023-02-02 RX ORDER — KETOROLAC TROMETHAMINE 15 MG/ML
15 INJECTION, SOLUTION INTRAMUSCULAR; INTRAVENOUS ONCE
Status: DISCONTINUED | OUTPATIENT
Start: 2023-02-02 | End: 2023-02-02 | Stop reason: HOSPADM

## 2023-02-02 RX ORDER — CEPHALEXIN 500 MG/1
1000 CAPSULE ORAL 2 TIMES DAILY
Qty: 28 CAPSULE | Refills: 0 | Status: SHIPPED | OUTPATIENT
Start: 2023-02-02 | End: 2023-02-09

## 2023-02-02 RX ORDER — ONDANSETRON 4 MG/1
8 TABLET, FILM COATED ORAL
Qty: 12 TABLET | Refills: 0 | Status: SHIPPED | OUTPATIENT
Start: 2023-02-02

## 2023-02-02 RX ADMIN — LIDOCAINE HYDROCHLORIDE 500 MG: 10 INJECTION, SOLUTION EPIDURAL; INFILTRATION; INTRACAUDAL; PERINEURAL at 18:59

## 2023-02-02 RX ADMIN — ONDANSETRON 8 MG: 8 TABLET, ORALLY DISINTEGRATING ORAL at 18:59

## 2023-02-02 RX ADMIN — AZITHROMYCIN MONOHYDRATE 1000 MG: 250 TABLET ORAL at 18:58

## 2023-02-02 NOTE — ED PROVIDER NOTES
EMERGENCY DEPARTMENT HISTORY AND PHYSICAL EXAM    Date: 2/2/2023  Patient Name: Micah Ma    History of Presenting Illness     Chief Complaint   Patient presents with    Vaginal Discharge    Abdominal Pain         History Provided By: Patient        Additional History (Context): Micah Ma is a 20 y.o. female with No significant past medical history who presents with complaint of pelvic discomfort and vaginal discharge for the past few days. Has unprotected intercourse. Wants to be tested for STDs. Also reports slight headache. Denies recent head trauma numbness weakness fever nuchal rigidity. PCP: None    Current Facility-Administered Medications   Medication Dose Route Frequency Provider Last Rate Last Admin    cefTRIAXone (ROCEPHIN) 500 mg in lidocaine (PF) (XYLOCAINE) 10 mg/mL (1 %) IM injection  500 mg IntraMUSCular ONCE Caty Ralph PA        ondansetron (ZOFRAN ODT) tablet 8 mg  8 mg Oral NOW Caty Ralph PA        azithromycin (ZITHROMAX) tablet 1,000 mg  1,000 mg Oral NOW Caty Ralph PA         Current Outpatient Medications   Medication Sig Dispense Refill    fluconazole (Diflucan) 150 mg tablet Take 1 Tablet by mouth once for 1 dose. Take upon completion of your oral antibiotics 1 Tablet 0    metroNIDAZOLE (FlagyL) 500 mg tablet Take 1 Tablet by mouth two (2) times a day for 7 days. 14 Tablet 0    cephALEXin (Keflex) 500 mg capsule Take 2 Capsules by mouth two (2) times a day for 7 days. 28 Capsule 0    ondansetron hcl (Zofran) 4 mg tablet Take 2 Tablets by mouth every eight (8) hours as needed for Nausea. 12 Tablet 0       Past History     Past Medical History:  Past Medical History:   Diagnosis Date    Asthma     Miscarriage        Past Surgical History:  No past surgical history on file. Family History:  No family history on file. Social History:  Social History     Tobacco Use    Smoking status: Every Day    Smokeless tobacco: Never   Substance Use Topics    Alcohol use:  No Drug use: No       Allergies:  No Known Allergies      Review of Systems   Review of Systems   Constitutional:  Negative for fever. Gastrointestinal:  Negative for nausea and vomiting. Genitourinary:  Positive for pelvic pain and vaginal discharge. Neurological:  Positive for headaches. All Other Systems Negative  Physical Exam     Vitals:    02/02/23 1804   BP: 120/63   Pulse: 67   Resp: 16   Temp: 98.6 °F (37 °C)   SpO2: 100%     Physical Exam  Vitals and nursing note reviewed. Constitutional:       Appearance: She is well-developed. HENT:      Head: Normocephalic and atraumatic. Right Ear: External ear normal.      Left Ear: External ear normal.      Nose: Nose normal.   Eyes:      Conjunctiva/sclera: Conjunctivae normal.      Pupils: Pupils are equal, round, and reactive to light. Neck:      Vascular: No JVD. Trachea: No tracheal deviation. Cardiovascular:      Rate and Rhythm: Normal rate and regular rhythm. Heart sounds: Normal heart sounds. No murmur heard. No friction rub. No gallop. Pulmonary:      Effort: Pulmonary effort is normal. No respiratory distress. Breath sounds: Normal breath sounds. No wheezing or rales. Abdominal:      General: Bowel sounds are normal. There is no distension. Palpations: Abdomen is soft. There is no mass. Tenderness: There is no abdominal tenderness. There is no right CVA tenderness, left CVA tenderness, guarding or rebound. Comments: Completely nontender abdominal pelvic truncal exam   Musculoskeletal:         General: No tenderness. Normal range of motion. Cervical back: Normal range of motion and neck supple. Skin:     General: Skin is warm and dry. Findings: No rash. Neurological:      Mental Status: She is alert and oriented to person, place, and time. Cranial Nerves: No cranial nerve deficit. Deep Tendon Reflexes: Reflexes are normal and symmetric.    Psychiatric:         Mood and Affect: Mood is not anxious or depressed. Behavior: Behavior normal.          Diagnostic Study Results     Labs -     Recent Results (from the past 12 hour(s))   URINALYSIS W/ RFLX MICROSCOPIC    Collection Time: 02/02/23  6:07 PM   Result Value Ref Range    Color YELLOW      Appearance CLOUDY      Specific gravity 1.027 1.005 - 1.030      pH (UA) 5.5 5.0 - 8.0      Protein Negative NEG mg/dL    Glucose Negative NEG mg/dL    Ketone TRACE (A) NEG mg/dL    Bilirubin Negative NEG      Blood Negative NEG      Urobilinogen 1.0 0.2 - 1.0 EU/dL    Nitrites Negative NEG      Leukocyte Esterase MODERATE (A) NEG     HCG URINE, QL    Collection Time: 02/02/23  6:07 PM   Result Value Ref Range    HCG urine, QL Negative NEG     WET PREP    Collection Time: 02/02/23  6:07 PM    Specimen: Cervical   Result Value Ref Range    Special Requests: NO SPECIAL REQUESTS      Wet prep FEW  CLUE CELLS PRESENT        Wet prep NO TRICHOMONAS SEEN      Wet prep NO YEAST SEEN     URINE MICROSCOPIC ONLY    Collection Time: 02/02/23  6:07 PM   Result Value Ref Range    WBC 4 to 10 0 - 4 /hpf    RBC 0 to 3 0 - 5 /hpf    Epithelial cells 4+ 0 - 5 /lpf    Bacteria 1+ (A) NEG /hpf    Mucus 3+ (A) NEG /lpf    Yeast FEW (A) NEG         Radiologic Studies -   No orders to display     CT Results  (Last 48 hours)      None          CXR Results  (Last 48 hours)      None              Medical Decision Making   I am the first provider for this patient. I reviewed the vital signs, available nursing notes, past medical history, past surgical history, family history and social history. Vital Signs-Reviewed the patient's vital signs. Records Reviewed: Prior medical records and Nursing notes    Procedures:  Procedures    Provider Notes (Medical Decision Making):   Patient is reporting vaginal discharge and has a yeast infection BV and a UTI.   We will treat yeast with Diflucan after completion of all of her oral antibiotics and BV with Flagyl advised against alcohol and treat her UTI. Will give prophylactic STD treatment for gonorrhea and chlamydia here as well as give Toradol dose for her headache. Advised to follow-up with the health department for routine testing of HIV syphilis and hepatitis to abstain from intercourse for 2 weeks and to inform partner of her visit today for testing and treatment. MED RECONCILIATION:  Current Facility-Administered Medications   Medication Dose Route Frequency    cefTRIAXone (ROCEPHIN) 500 mg in lidocaine (PF) (XYLOCAINE) 10 mg/mL (1 %) IM injection  500 mg IntraMUSCular ONCE    ondansetron (ZOFRAN ODT) tablet 8 mg  8 mg Oral NOW    azithromycin (ZITHROMAX) tablet 1,000 mg  1,000 mg Oral NOW     Current Outpatient Medications   Medication Sig    fluconazole (Diflucan) 150 mg tablet Take 1 Tablet by mouth once for 1 dose. Take upon completion of your oral antibiotics    metroNIDAZOLE (FlagyL) 500 mg tablet Take 1 Tablet by mouth two (2) times a day for 7 days. cephALEXin (Keflex) 500 mg capsule Take 2 Capsules by mouth two (2) times a day for 7 days. ondansetron hcl (Zofran) 4 mg tablet Take 2 Tablets by mouth every eight (8) hours as needed for Nausea. Disposition:  home    DISCHARGE NOTE:   6:58 PM    Pt has been reexamined. Patient has no new complaints, changes, or physical findings. Care plan outlined and precautions discussed. Results of labs were reviewed with the patient. All medications were reviewed with the patient; will d/c home with zofran, keflex, flagyl, diflucan. All of pt's questions and concerns were addressed. Patient was instructed and agrees to follow up with PCP, HD, as well as to return to the ED upon further deterioration. Patient is ready to go home.     Follow-up Information       Follow up With Specialties Details Why [address] [place] [address]  Schedule an appointment as soon as possible for a visit in 3 days  [address] [place] [address] [address] [facility]    [facility] EMERGENCY DEPT Emergency Medicine  If symptoms worsen return immediately [address] [place] [id number]  [phone]            Current Discharge Medication List        START taking these medications    Details   fluconazole (Diflucan) 150 mg tablet Take 1 Tablet by mouth once for 1 dose. Take upon completion of your oral antibiotics  Qty: 1 Tablet, Refills: 0  Start date: 2/2/2023, End date: 2/2/2023      metroNIDAZOLE (FlagyL) 500 mg tablet Take 1 Tablet by mouth two (2) times a day for 7 days. Qty: 14 Tablet, Refills: 0  Start date: 2/2/2023, End date: 2/9/2023      cephALEXin (Keflex) 500 mg capsule Take 2 Capsules by mouth two (2) times a day for 7 days. Qty: 28 Capsule, Refills: 0  Start date: 2/2/2023, End date: 2/9/2023      ondansetron hcl (Zofran) 4 mg tablet Take 2 Tablets by mouth every eight (8) hours as needed for Nausea. Qty: 12 Tablet, Refills: 0  Start date: 2/2/2023             Diagnosis     Clinical Impression:   1. Possible exposure to STD    2. BV (bacterial vaginosis)    3. Acute UTI    4.  Candida cystitis

## 2023-02-02 NOTE — ED TRIAGE NOTES
Patient presents to the ED with complaints of vaginal discharge with concerns of STI. Also states she has abdominal pain \"right in the middle\" that has been going on for a few days.     Denies any N/V/D, pain comes and goes and states it feels like cramps    LMP 1/16/23

## 2023-02-03 NOTE — ED PROVIDER NOTES
RESATTEST: I was personally available for consultation in the emergency department. I have reviewed the chart and agree with the documentation recorded by the [facility] AND [facility], including the assessment, treatment plan, and disposition.   Zoran Murphy MD    Procedures

## 2023-02-03 NOTE — ED NOTES
7:03 PM  02/02/23     Discharge instructions given to patient (name) with verbalization of understanding. Patient accompanied by no one. Patient discharged with the following prescriptions sent to pharmacy. Patient discharged to home (destination).       Scotty Erwin

## 2023-03-23 ENCOUNTER — HOSPITAL ENCOUNTER (EMERGENCY)
Facility: HOSPITAL | Age: 23
Discharge: HOME | End: 2023-03-23
Attending: EMERGENCY MEDICINE
Payer: MEDICAID

## 2023-03-23 VITALS
BODY MASS INDEX: 27.99 KG/M2 | HEART RATE: 68 BPM | WEIGHT: 168 LBS | RESPIRATION RATE: 18 BRPM | TEMPERATURE: 98.7 F | OXYGEN SATURATION: 100 % | DIASTOLIC BLOOD PRESSURE: 75 MMHG | HEIGHT: 65 IN | SYSTOLIC BLOOD PRESSURE: 117 MMHG

## 2023-03-23 DIAGNOSIS — N76.0 ACUTE VAGINITIS: Primary | ICD-10-CM

## 2023-03-23 LAB
APPEARANCE UR: CLEAR
BILIRUB UR QL: NEGATIVE
COLOR UR: YELLOW
GLUCOSE UR STRIP.AUTO-MCNC: NEGATIVE MG/DL
HCG UR QL: NEGATIVE
HGB UR QL STRIP: NEGATIVE
KETONES UR QL STRIP.AUTO: NEGATIVE MG/DL
LEUKOCYTE ESTERASE UR QL STRIP.AUTO: NEGATIVE
NITRITE UR QL STRIP.AUTO: NEGATIVE
PH UR STRIP: 5.5 (ref 5–8)
PROT UR STRIP-MCNC: NEGATIVE MG/DL
SERVICE CMNT-IMP: NORMAL
SP GR UR REFRACTOMETRY: 1.02 (ref 1–1.03)
UROBILINOGEN UR QL STRIP.AUTO: 0.2 EU/DL (ref 0.2–1)
WET PREP GENITAL: NORMAL

## 2023-03-23 PROCEDURE — 99283 EMERGENCY DEPT VISIT LOW MDM: CPT

## 2023-03-23 PROCEDURE — 87661 TRICHOMONAS VAGINALIS AMPLIF: CPT

## 2023-03-23 PROCEDURE — 81003 URINALYSIS AUTO W/O SCOPE: CPT

## 2023-03-23 PROCEDURE — 81025 URINE PREGNANCY TEST: CPT

## 2023-03-23 PROCEDURE — 87210 SMEAR WET MOUNT SALINE/INK: CPT

## 2023-03-23 RX ORDER — METRONIDAZOLE 500 MG/1
500 TABLET ORAL 2 TIMES DAILY
Qty: 14 TABLET | Refills: 0 | Status: SHIPPED | OUTPATIENT
Start: 2023-03-23 | End: 2023-03-30

## 2023-03-23 NOTE — ED PROVIDER NOTES
[facility] EMERGENCY DEPT  EMERGENCY DEPARTMENT ENCOUNTER      Pt Name: Eric Pope  MRN: [id number]  [place] 2000  Date of evaluation: 3/23/2023  Provider: [facility]       Chief Complaint   Patient presents with    Vaginal Discharge         HISTORY OF PRESENT ILLNESS   (Location/Symptom, Timing/Onset, Context/Setting, Quality, Duration, Modifying Factors, Severity)  Note limiting factors. Eric Pope is a 20 y.o. female who presents to the emergency department complaint of vaginal discharge for the past few days. Has tried boric acid without relief. Says initially it was malodorous. Has little concern for STDs. Denies dysuria frequency or burning flank pain or fever. Denies possibility of pregnancy. HPI    Nursing Notes were reviewed. REVIEW OF SYSTEMS    (2-9 systems for level 4, 10 or more for level 5)     Review of Systems    Except as noted above the remainder of the review of systems was reviewed and negative. PAST MEDICAL HISTORY     Past Medical History:   Diagnosis Date    Asthma     Miscarriage          SURGICAL HISTORY     No past surgical history on file. CURRENT MEDICATIONS       Previous Medications    No medications on file       ALLERGIES     Patient has no known allergies. FAMILY HISTORY     No family history on file.        SOCIAL HISTORY       Social History     Socioeconomic History    Marital status: Single   Tobacco Use    Smoking status: Every Day    Smokeless tobacco: Never   Substance and Sexual Activity    Alcohol use: No    Drug use: No       SCREENINGS         Morgan Coma Scale  Eye Opening: Spontaneous  Best Verbal Response: Oriented  Best Motor Response: Obeys commands  Morgan Coma Scale Score: 15                     CIWA Assessment  BP: 117/75  Heart Rate: 68                 PHYSICAL EXAM    (up to 7 for level 4, 8 or more for level 5)     ED Triage Vitals [03/23/23 0823]   BP Temp Temp Source Heart Rate Resp SpO2 Height Weight

## 2023-03-23 NOTE — Clinical Note
Rosy King was seen and treated in our emergency department on 3/23/2023. She may return to work on 03/24/2023. If you have any questions or concerns, please don't hesitate to call.       BEBETO Hunter

## 2023-03-23 NOTE — ED TRIAGE NOTES
Pt c/o of vaginal discharge. Reports she did a \"honey pot\" at home without any relief. LMP 3/17/23.

## 2023-03-24 LAB
C TRACH RRNA SPEC QL NAA+PROBE: NEGATIVE
N GONORRHOEA RRNA SPEC QL NAA+PROBE: NEGATIVE
SPECIMEN SOURCE: NORMAL
T VAGINALIS RRNA SPEC QL NAA+PROBE: NEGATIVE

## 2023-04-02 ASSESSMENT — ENCOUNTER SYMPTOMS
RESPIRATORY NEGATIVE: 1
ALLERGIC/IMMUNOLOGIC NEGATIVE: 1
NAUSEA: 0
VOMITING: 0
EYES NEGATIVE: 1
ABDOMINAL PAIN: 0

## 2023-05-05 ENCOUNTER — HOSPITAL ENCOUNTER (EMERGENCY)
Facility: HOSPITAL | Age: 23
Discharge: HOME | End: 2023-05-05
Attending: EMERGENCY MEDICINE
Payer: MEDICAID

## 2023-05-05 VITALS
HEART RATE: 74 BPM | RESPIRATION RATE: 18 BRPM | TEMPERATURE: 98.5 F | OXYGEN SATURATION: 100 % | SYSTOLIC BLOOD PRESSURE: 125 MMHG | WEIGHT: 168 LBS | DIASTOLIC BLOOD PRESSURE: 76 MMHG | BODY MASS INDEX: 27.99 KG/M2 | HEIGHT: 65 IN

## 2023-05-05 DIAGNOSIS — K59.1 FUNCTIONAL DIARRHEA: Primary | ICD-10-CM

## 2023-05-05 PROCEDURE — 6370000000 HC RX 637 (ALT 250 FOR IP)

## 2023-05-05 PROCEDURE — 99283 EMERGENCY DEPT VISIT LOW MDM: CPT

## 2023-05-05 RX ORDER — LOPERAMIDE HYDROCHLORIDE 2 MG/1
2 CAPSULE ORAL 4 TIMES DAILY PRN
Qty: 40 CAPSULE | Refills: 0 | Status: SHIPPED | OUTPATIENT
Start: 2023-05-05 | End: 2023-05-15

## 2023-05-05 RX ORDER — LOPERAMIDE HYDROCHLORIDE 2 MG/1
2 CAPSULE ORAL
Status: COMPLETED | OUTPATIENT
Start: 2023-05-05 | End: 2023-05-05

## 2023-05-05 RX ADMIN — LOPERAMIDE HYDROCHLORIDE 2 MG: 2 CAPSULE ORAL at 14:06

## 2023-05-05 ASSESSMENT — ENCOUNTER SYMPTOMS
DIARRHEA: 1
CONSTIPATION: 0
SORE THROAT: 0
VOMITING: 0
RECTAL PAIN: 0
ABDOMINAL PAIN: 0
ABDOMINAL DISTENTION: 0
COLOR CHANGE: 0
ANAL BLEEDING: 0
NAUSEA: 0
SINUS PAIN: 0
SINUS PRESSURE: 0
BLOOD IN STOOL: 0
RHINORRHEA: 0

## 2023-05-05 ASSESSMENT — PAIN - FUNCTIONAL ASSESSMENT: PAIN_FUNCTIONAL_ASSESSMENT: NONE - DENIES PAIN

## 2023-05-05 NOTE — ED PROVIDER NOTES
[facility] EMERGENCY DEPT  EMERGENCY DEPARTMENT ENCOUNTER      Pt Name: Sara Bradford  MRN: [id number]  [place] 2000  Date of evaluation: 5/5/2023  Provider: Jacques Hardy MD    CHIEF COMPLAINT       Chief Complaint   Patient presents with    Abdominal Pain    Diarrhea         HISTORY OF PRESENT ILLNESS   (Location/Symptom, Timing/Onset, Context/Setting, Quality, Duration, Modifying Factors, Severity)  Note limiting factors. 22yo otherwise healthy female presenting to [facility] ED for two episodes of loose stools. Patient reports that yesterday evening she ate [employer]. Afterwards she had two episode of loose stools that were non bloody. She has not had any nausea, vomiting or fevers. She does not feel weak or that she is going to pass out. No new foods. No recent travel. No recent antibiotic use. She has not taken any medication at home to help, she has just been drinking Ginger Ale. Able to tolerate PO intake. Nursing Notes were reviewed. REVIEW OF SYSTEMS    (2-9 systems for level 4, 10 or more for level 5)     Review of Systems   Constitutional:  Negative for appetite change, chills, diaphoresis, fatigue and fever. HENT:  Negative for congestion, drooling, rhinorrhea, sinus pressure, sinus pain, sneezing, sore throat and tinnitus. Cardiovascular:  Negative for chest pain and leg swelling. Gastrointestinal:  Positive for diarrhea. Negative for abdominal distention, abdominal pain, anal bleeding, blood in stool, constipation, nausea, rectal pain and vomiting. Genitourinary:  Negative for decreased urine volume, difficulty urinating, dysuria, frequency, hematuria, vaginal bleeding, vaginal discharge and vaginal pain. Skin:  Negative for color change, pallor and rash. Neurological:  Negative for dizziness, syncope and headaches. Psychiatric/Behavioral:  Negative for agitation. Except as noted above the remainder of the review of systems was reviewed and negative.        PAST MEDICAL

## 2023-05-05 NOTE — ED TRIAGE NOTES
Pt c/o of abdominal pain and diarrhea. Reports she thinks she ate something bad. LMP ended on the 12. Pt not actively vomiting.

## 2023-05-17 RX ORDER — ONDANSETRON 4 MG/1
8 TABLET, FILM COATED ORAL EVERY 8 HOURS PRN
COMMUNITY
Start: 2023-02-02

## 2023-06-08 ENCOUNTER — HOSPITAL ENCOUNTER (EMERGENCY)
Facility: HOSPITAL | Age: 23
Discharge: HOME | End: 2023-06-08
Attending: EMERGENCY MEDICINE
Payer: MEDICAID

## 2023-06-08 VITALS
TEMPERATURE: 98 F | RESPIRATION RATE: 20 BRPM | BODY MASS INDEX: 27.16 KG/M2 | HEIGHT: 66 IN | SYSTOLIC BLOOD PRESSURE: 110 MMHG | WEIGHT: 169 LBS | DIASTOLIC BLOOD PRESSURE: 60 MMHG | OXYGEN SATURATION: 100 % | HEART RATE: 80 BPM

## 2023-06-08 DIAGNOSIS — J00 ACUTE NASOPHARYNGITIS (COMMON COLD): Primary | ICD-10-CM

## 2023-06-08 DIAGNOSIS — R05.1 ACUTE COUGH: ICD-10-CM

## 2023-06-08 DIAGNOSIS — R09.81 NASAL CONGESTION: ICD-10-CM

## 2023-06-08 LAB
FLUAV AG NPH QL IA: NEGATIVE
FLUBV AG NOSE QL IA: NEGATIVE
SARS-COV-2 RDRP RESP QL NAA+PROBE: NOT DETECTED
SOURCE: NORMAL

## 2023-06-08 PROCEDURE — 87804 INFLUENZA ASSAY W/OPTIC: CPT

## 2023-06-08 PROCEDURE — 87635 SARS-COV-2 COVID-19 AMP PRB: CPT

## 2023-06-08 RX ORDER — GUAIFENESIN 600 MG/1
600 TABLET, EXTENDED RELEASE ORAL 2 TIMES DAILY
Qty: 20 TABLET | Refills: 0 | Status: SHIPPED | OUTPATIENT
Start: 2023-06-08 | End: 2023-06-18

## 2023-06-08 ASSESSMENT — ENCOUNTER SYMPTOMS
SHORTNESS OF BREATH: 0
BLOOD IN STOOL: 0
SORE THROAT: 0
NAUSEA: 0
ABDOMINAL DISTENTION: 0
ABDOMINAL PAIN: 0
VOMITING: 0
EYES NEGATIVE: 1
RHINORRHEA: 1
DIARRHEA: 0
BACK PAIN: 0
APNEA: 0
CHEST TIGHTNESS: 0
SINUS PAIN: 0
SINUS PRESSURE: 0
FACIAL SWELLING: 0
CONSTIPATION: 0
ANAL BLEEDING: 0
TROUBLE SWALLOWING: 0
COUGH: 1

## 2023-06-08 ASSESSMENT — PAIN DESCRIPTION - LOCATION: LOCATION: FACE

## 2023-06-08 ASSESSMENT — PAIN - FUNCTIONAL ASSESSMENT: PAIN_FUNCTIONAL_ASSESSMENT: 0-10

## 2023-06-08 NOTE — DISCHARGE INSTRUCTIONS
Push fluids, use a vaporizer, use Tylenol or OTC NSAID's (Advil, Alleve etc) for fever or achiness, OTC cough suppressant/decongestants such as Robitussin CF and rest. The symptoms usually resolve in 4-7 days. Call if symptoms persist or develops new symptoms such as wheezing or shortness of breath.

## 2023-06-08 NOTE — ED PROVIDER NOTES
[phone]   guaiFENesin 600 MG extended release tablet          Dictation disclaimer:  Please note that this dictation was completed with [employer], the computer voice recognition software. Quite often unanticipated grammatical, syntax, homophones, and other interpretive errors are inadvertently transcribed by the computer software. Please disregard these errors. Please excuse any errors that have escaped final proofreading.          LEO Townsend NP  06/08/23 6078

## 2023-06-08 NOTE — ED TRIAGE NOTES
Pt reports onset of nasal congestion, cough productive of clear sputum, loss of taste & smell on Sunday. Denies any known ill contacts. Denies fevers.

## 2023-06-08 NOTE — ED NOTES
Discharge instructions given to patient. Follow up information provided. Verbalized understanding.       Amber León RN  06/08/23 7074

## 2023-06-28 ENCOUNTER — HOSPITAL ENCOUNTER (EMERGENCY)
Facility: HOSPITAL | Age: 23
Discharge: HOME | End: 2023-06-28
Attending: STUDENT IN AN ORGANIZED HEALTH CARE EDUCATION/TRAINING PROGRAM
Payer: MEDICAID

## 2023-06-28 VITALS
HEIGHT: 66 IN | BODY MASS INDEX: 27.16 KG/M2 | DIASTOLIC BLOOD PRESSURE: 71 MMHG | OXYGEN SATURATION: 100 % | TEMPERATURE: 98.6 F | WEIGHT: 169 LBS | SYSTOLIC BLOOD PRESSURE: 101 MMHG | RESPIRATION RATE: 16 BRPM | HEART RATE: 78 BPM

## 2023-06-28 DIAGNOSIS — N30.00 ACUTE CYSTITIS WITHOUT HEMATURIA: Primary | ICD-10-CM

## 2023-06-28 LAB
APPEARANCE UR: CLEAR
BACTERIA URNS QL MICRO: ABNORMAL /HPF
BILIRUB UR QL: NEGATIVE
COLOR UR: YELLOW
EPITH CASTS URNS QL MICRO: ABNORMAL /LPF (ref 0–5)
GLUCOSE UR STRIP.AUTO-MCNC: NEGATIVE MG/DL
HCG UR QL: NEGATIVE
HGB UR QL STRIP: NEGATIVE
KETONES UR QL STRIP.AUTO: ABNORMAL MG/DL
LEUKOCYTE ESTERASE UR QL STRIP.AUTO: ABNORMAL
NITRITE UR QL STRIP.AUTO: NEGATIVE
PH UR STRIP: 6.5 (ref 5–8)
PROT UR STRIP-MCNC: NEGATIVE MG/DL
RBC #/AREA URNS HPF: ABNORMAL /HPF (ref 0–5)
SERVICE CMNT-IMP: NORMAL
SP GR UR REFRACTOMETRY: >1.03 (ref 1–1.03)
UROBILINOGEN UR QL STRIP.AUTO: 1 EU/DL (ref 0.2–1)
WBC URNS QL MICRO: ABNORMAL /HPF (ref 0–4)
WET PREP GENITAL: NORMAL

## 2023-06-28 PROCEDURE — 81001 URINALYSIS AUTO W/SCOPE: CPT

## 2023-06-28 PROCEDURE — 81025 URINE PREGNANCY TEST: CPT

## 2023-06-28 PROCEDURE — 87210 SMEAR WET MOUNT SALINE/INK: CPT

## 2023-06-28 PROCEDURE — 87086 URINE CULTURE/COLONY COUNT: CPT

## 2023-06-28 PROCEDURE — 99283 EMERGENCY DEPT VISIT LOW MDM: CPT

## 2023-06-28 RX ORDER — CEPHALEXIN 500 MG/1
500 CAPSULE ORAL 2 TIMES DAILY
Qty: 14 CAPSULE | Refills: 0 | Status: SHIPPED | OUTPATIENT
Start: 2023-06-28 | End: 2023-07-05

## 2023-06-28 ASSESSMENT — PAIN - FUNCTIONAL ASSESSMENT: PAIN_FUNCTIONAL_ASSESSMENT: NONE - DENIES PAIN

## 2023-06-28 ASSESSMENT — ENCOUNTER SYMPTOMS
CONSTIPATION: 0
NAUSEA: 0
ABDOMINAL PAIN: 0
DIARRHEA: 0
VOMITING: 0
COUGH: 0
CHEST TIGHTNESS: 0
SHORTNESS OF BREATH: 0

## 2023-06-28 ASSESSMENT — LIFESTYLE VARIABLES
HOW OFTEN DO YOU HAVE A DRINK CONTAINING ALCOHOL: 2-4 TIMES A MONTH
HOW MANY STANDARD DRINKS CONTAINING ALCOHOL DO YOU HAVE ON A TYPICAL DAY: 3 OR 4

## 2023-06-30 LAB
BACTERIA SPEC CULT: NORMAL
CC UR VC: NORMAL
SERVICE CMNT-IMP: NORMAL

## 2023-11-16 ENCOUNTER — HOSPITAL ENCOUNTER (EMERGENCY)
Facility: HOSPITAL | Age: 23
Discharge: HOME | End: 2023-11-16
Payer: MEDICAID

## 2023-11-16 ENCOUNTER — APPOINTMENT (OUTPATIENT)
Facility: HOSPITAL | Age: 23
End: 2023-11-16
Payer: MEDICAID

## 2023-11-16 VITALS
OXYGEN SATURATION: 100 % | HEIGHT: 66 IN | DIASTOLIC BLOOD PRESSURE: 77 MMHG | BODY MASS INDEX: 28.61 KG/M2 | RESPIRATION RATE: 13 BRPM | WEIGHT: 178 LBS | TEMPERATURE: 98.8 F | HEART RATE: 92 BPM | SYSTOLIC BLOOD PRESSURE: 120 MMHG
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
## 2023-11-16 DIAGNOSIS — L03.011 CELLULITIS OF FINGER OF RIGHT HAND: ICD-10-CM

## 2023-11-16 PROCEDURE — 90472 IMMUNIZATION ADMIN EACH ADD: CPT | Performed by: EMERGENCY MEDICINE

## 2023-11-16 PROCEDURE — 90471 IMMUNIZATION ADMIN: CPT | Performed by: EMERGENCY MEDICINE

## 2023-11-16 PROCEDURE — 99284 EMERGENCY DEPT VISIT MOD MDM: CPT

## 2023-11-16 PROCEDURE — 6360000002 HC RX W HCPCS: Performed by: EMERGENCY MEDICINE

## 2023-11-16 PROCEDURE — 73130 X-RAY EXAM OF HAND: CPT

## 2023-11-16 PROCEDURE — 90715 TDAP VACCINE 7 YRS/> IM: CPT | Performed by: EMERGENCY MEDICINE

## 2023-11-16 PROCEDURE — 90675 RABIES VACCINE IM: CPT | Performed by: EMERGENCY MEDICINE

## 2023-11-16 PROCEDURE — 90375 RABIES IG IM/SC: CPT | Performed by: EMERGENCY MEDICINE

## 2023-11-16 RX ORDER — AMOXICILLIN AND CLAVULANATE POTASSIUM 875; 125 MG/1; MG/1
1 TABLET, FILM COATED ORAL 2 TIMES DAILY
COMMUNITY

## 2023-11-16 RX ADMIN — TETANUS TOXOID, REDUCED DIPHTHERIA TOXOID AND ACELLULAR PERTUSSIS VACCINE, ADSORBED 0.5 ML: 5; 2.5; 8; 8; 2.5 SUSPENSION INTRAMUSCULAR at 14:48

## 2023-11-16 RX ADMIN — RABIES IMMUNE GLOBULIN (HUMAN) 1620 UNITS: 300 INJECTION, SOLUTION INFILTRATION; INTRAMUSCULAR at 14:34

## 2023-11-16 RX ADMIN — RABIES VACCINE 1 ML: KIT at 14:49

## 2023-11-16 ASSESSMENT — PAIN - FUNCTIONAL ASSESSMENT: PAIN_FUNCTIONAL_ASSESSMENT: 0-10

## 2023-11-16 ASSESSMENT — ENCOUNTER SYMPTOMS
GASTROINTESTINAL NEGATIVE: 1
RESPIRATORY NEGATIVE: 1
COLOR CHANGE: 1
ALLERGIC/IMMUNOLOGIC NEGATIVE: 1
EYES NEGATIVE: 1

## 2023-11-16 ASSESSMENT — PAIN SCALES - GENERAL: PAINLEVEL_OUTOF10: 6

## 2023-11-16 NOTE — ED PROVIDER NOTES
[facility] EMERGENCY DEPT  EMERGENCY DEPARTMENT ENCOUNTER      Pt Name: Ariana eLal  MRN: [id number]  [address] [facility] 2000  Date of evaluation: 11/16/2023  Provider: BEBETO Kirkland    CHIEF COMPLAINT       Chief Complaint   Patient presents with    Animal Bite         HISTORY OF PRESENT ILLNESS   (Location/Symptom, Timing/Onset, Context/Setting, Quality, Duration, Modifying Factors, Severity)  Note limiting factors. Ariana Leal is a 24 y.o. female who presents to the emergency department with complaint of right hand and finger pain. Yesterday her [place] terrier bit her hand when she was removing the food bowl. She Tempel was empty. Unfortunately, she said the dog's rabies vaccinations are not up-to-date. The dog technically belongs to her mother and she has been helping. Denies possibility of pregnancy. [facility]. She started taking the Augmentin just today. Patient is right-hand-dominant. HPI    Nursing Notes were reviewed. REVIEW OF SYSTEMS    (2-9 systems for level 4, 10 or more for level 5)     Review of Systems   Constitutional: Negative. HENT: Negative. Eyes: Negative. Respiratory: Negative. Cardiovascular: Negative. Gastrointestinal: Negative. Endocrine: Negative. Genitourinary: Negative. Musculoskeletal:  Positive for arthralgias and joint swelling. Skin:  Positive for color change and wound. Allergic/Immunologic: Negative. Neurological: Negative. Hematological:  Does not bruise/bleed easily. Psychiatric/Behavioral:  Negative for confusion. Except as noted above the remainder of the review of systems was reviewed and negative. PAST MEDICAL HISTORY     Past Medical History:   Diagnosis Date    Asthma     Miscarriage          SURGICAL HISTORY     History reviewed. No pertinent surgical history.       CURRENT MEDICATIONS       Previous Medications    AMOXICILLIN-CLAVULANATE (AUGMENTIN) 875-125 MG PER TABLET

## 2023-11-16 NOTE — DISCHARGE INSTRUCTIONS
You will eventually need to be seen for your rabies vaccine to be completed on days 0, 3, 7, and 14. Return in 3 days for your second rabies vaccination.

## 2023-11-16 NOTE — ED TRIAGE NOTES
Patient arrives with dog bite to right 2nd digit. Noticeable swelling, redness and puncture wounds. Patient reports her own dog bit her after feeding it yesterday. States the dog's vaccinations are not up to date. Went to [facility] prior to arrival and prescribed antibiotics. Asking for Rabies prevention vaccine.

## 2023-11-19 ENCOUNTER — HOSPITAL ENCOUNTER (EMERGENCY)
Facility: HOSPITAL | Age: 23
Discharge: HOME | End: 2023-11-19
Attending: EMERGENCY MEDICINE
Payer: MEDICAID

## 2023-11-19 VITALS
TEMPERATURE: 98.8 F | DIASTOLIC BLOOD PRESSURE: 70 MMHG | BODY MASS INDEX: 28.61 KG/M2 | RESPIRATION RATE: 17 BRPM | HEART RATE: 81 BPM | OXYGEN SATURATION: 100 % | SYSTOLIC BLOOD PRESSURE: 119 MMHG | WEIGHT: 178 LBS | HEIGHT: 66 IN

## 2023-11-19 DIAGNOSIS — Z23 NEED FOR IMMUNIZATION AGAINST RABIES: Primary | ICD-10-CM
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
## 2023-11-19 PROCEDURE — 90675 RABIES VACCINE IM: CPT | Performed by: EMERGENCY MEDICINE

## 2023-11-19 PROCEDURE — 90471 IMMUNIZATION ADMIN: CPT | Performed by: EMERGENCY MEDICINE

## 2023-11-19 PROCEDURE — 99284 EMERGENCY DEPT VISIT MOD MDM: CPT

## 2023-11-19 PROCEDURE — 6360000002 HC RX W HCPCS: Performed by: EMERGENCY MEDICINE

## 2023-11-19 RX ADMIN — RABIES VACCINE 1 ML: KIT at 15:23

## 2023-11-19 ASSESSMENT — PAIN - FUNCTIONAL ASSESSMENT: PAIN_FUNCTIONAL_ASSESSMENT: NONE - DENIES PAIN

## 2023-11-19 NOTE — ED NOTES
Discharge instructions reviewed with patient. Patient advised to follow up as directed on discharge instructions. Patient denies questions, needs or concerns at this time. Patient verbalized understanding. No s/sx of distress noted.         Flaco Moser RN  11/19/23 0265

## 2023-11-21 PROBLEM — Z20.3 NEED FOR POST EXPOSURE PROPHYLAXIS FOR RABIES: Status: ACTIVE | Noted: 2023-11-21

## 2023-11-21 RX ORDER — EPINEPHRINE 1 MG/ML
0.3 INJECTION, SOLUTION, CONCENTRATE INTRAVENOUS PRN
Status: CANCELLED | OUTPATIENT
Start: 2023-11-26

## 2023-11-21 RX ORDER — ALBUTEROL SULFATE 90 UG/1
4 AEROSOL, METERED RESPIRATORY (INHALATION) PRN
Status: CANCELLED | OUTPATIENT
Start: 2023-11-26

## 2023-11-21 RX ORDER — SODIUM CHLORIDE 9 MG/ML
INJECTION, SOLUTION INTRAVENOUS CONTINUOUS
Status: CANCELLED | OUTPATIENT
Start: 2023-11-26

## 2023-11-21 RX ORDER — DIPHENHYDRAMINE HYDROCHLORIDE 50 MG/ML
50 INJECTION INTRAMUSCULAR; INTRAVENOUS
Status: CANCELLED | OUTPATIENT
Start: 2023-11-26

## 2023-11-21 RX ORDER — ACETAMINOPHEN 325 MG/1
650 TABLET ORAL
Status: CANCELLED | OUTPATIENT
Start: 2023-11-26

## 2023-11-21 RX ORDER — ONDANSETRON 2 MG/ML
8 INJECTION INTRAMUSCULAR; INTRAVENOUS
Status: CANCELLED | OUTPATIENT
Start: 2023-11-26

## 2023-11-26 ENCOUNTER — HOSPITAL ENCOUNTER (OUTPATIENT)
Facility: HOSPITAL | Age: 23
Setting detail: INFUSION SERIES
Discharge: HOME | End: 2023-11-29
Payer: MEDICAID

## 2023-11-26 VITALS
DIASTOLIC BLOOD PRESSURE: 73 MMHG | HEART RATE: 87 BPM | OXYGEN SATURATION: 100 % | RESPIRATION RATE: 16 BRPM | TEMPERATURE: 98.3 F | SYSTOLIC BLOOD PRESSURE: 121 MMHG

## 2023-11-26 DIAGNOSIS — Z20.3 NEED FOR POST EXPOSURE PROPHYLAXIS FOR RABIES: ICD-10-CM

## 2023-11-26 PROCEDURE — 90471 IMMUNIZATION ADMIN: CPT | Performed by: EMERGENCY MEDICINE

## 2023-11-26 PROCEDURE — 96372 THER/PROPH/DIAG INJ SC/IM: CPT

## 2023-11-26 PROCEDURE — 6360000002 HC RX W HCPCS: Performed by: EMERGENCY MEDICINE

## 2023-11-26 PROCEDURE — 90675 RABIES VACCINE IM: CPT | Performed by: EMERGENCY MEDICINE

## 2023-11-26 RX ADMIN — RABIES VACCINE 1 ML: KIT at 09:56

## 2023-12-03 ENCOUNTER — APPOINTMENT (OUTPATIENT)
Facility: HOSPITAL | Age: 23
End: 2023-12-03

## 2023-12-04 ENCOUNTER — HOSPITAL ENCOUNTER (OUTPATIENT)
Facility: HOSPITAL | Age: 23
Setting detail: INFUSION SERIES
Discharge: HOME | End: 2023-12-07

## 2023-12-04 VITALS
DIASTOLIC BLOOD PRESSURE: 73 MMHG | HEART RATE: 68 BPM | OXYGEN SATURATION: 99 % | SYSTOLIC BLOOD PRESSURE: 109 MMHG | TEMPERATURE: 98.3 F | RESPIRATION RATE: 16 BRPM

## 2023-12-04 DIAGNOSIS — Z20.3 NEED FOR POST EXPOSURE PROPHYLAXIS FOR RABIES: Primary | ICD-10-CM

## 2023-12-04 PROCEDURE — 90471 IMMUNIZATION ADMIN: CPT

## 2023-12-04 PROCEDURE — 90675 RABIES VACCINE IM: CPT | Performed by: EMERGENCY MEDICINE

## 2023-12-04 PROCEDURE — 6360000002 HC RX W HCPCS: Performed by: EMERGENCY MEDICINE

## 2023-12-04 PROCEDURE — 90471 IMMUNIZATION ADMIN: CPT | Performed by: EMERGENCY MEDICINE

## 2023-12-04 RX ORDER — ONDANSETRON 2 MG/ML
8 INJECTION INTRAMUSCULAR; INTRAVENOUS
OUTPATIENT
Start: 2023-12-04

## 2023-12-04 RX ORDER — ALBUTEROL SULFATE 90 UG/1
4 AEROSOL, METERED RESPIRATORY (INHALATION) PRN
OUTPATIENT
Start: 2023-12-04

## 2023-12-04 RX ORDER — EPINEPHRINE 1 MG/ML
0.3 INJECTION, SOLUTION, CONCENTRATE INTRAVENOUS PRN
OUTPATIENT
Start: 2023-12-04

## 2023-12-04 RX ORDER — SODIUM CHLORIDE 9 MG/ML
INJECTION, SOLUTION INTRAVENOUS CONTINUOUS
OUTPATIENT
Start: 2023-12-04

## 2023-12-04 RX ORDER — ACETAMINOPHEN 325 MG/1
650 TABLET ORAL
OUTPATIENT
Start: 2023-12-04

## 2023-12-04 RX ORDER — DIPHENHYDRAMINE HYDROCHLORIDE 50 MG/ML
50 INJECTION INTRAMUSCULAR; INTRAVENOUS
OUTPATIENT
Start: 2023-12-04

## 2023-12-04 RX ADMIN — RABIES VACCINE 1 ML: KIT at 08:36

## 2023-12-04 ASSESSMENT — PAIN - FUNCTIONAL ASSESSMENT: PAIN_FUNCTIONAL_ASSESSMENT: NONE - DENIES PAIN

## 2023-12-09 NOTE — ED PROVIDER NOTES
EMERGENCY DEPARTMENT HISTORY AND PHYSICAL EXAM      Patient Name: Jed Menjivar  MRN: [id number]  YOB: 2000  Provider: Filomena Rossi MD  PCP: None, None   Time/Date of evaluation: 2:06 PM EST on 12/9/23    History of Presenting Illness     Chief Complaint   Patient presents with    rabies shot       History Provided By: Patient     History Stephany Prabhjot): Jed Menjivar is a 24 y.o. female with no contributory PMHx who presents to the emergency department  by POV C/O second rabies shot after dog bite. She was seen on November 16, 2023 for dog bite. She was advised to return today for her second rabies shot. She denies any other complaints or symptoms at this time. Past History     Past Medical History:  Past Medical History:   Diagnosis Date    Asthma     Dog bite 12/2023    Miscarriage        Past Surgical History:  History reviewed. No pertinent surgical history. Family History:  History reviewed. No pertinent family history. Social History:  Social History     Tobacco Use    Smoking status: Every Day    Smokeless tobacco: Never   Substance Use Topics    Alcohol use: No    Drug use: No       Medications:  No current facility-administered medications for this encounter. Current Outpatient Medications   Medication Sig Dispense Refill    amoxicillin-clavulanate (AUGMENTIN) 875-125 MG per tablet Take 1 tablet by mouth 2 times daily (Patient not taking: Reported on 12/4/2023)      ondansetron (ZOFRAN) 4 MG tablet Take 2 tablets by mouth every 8 hours as needed         Allergies:   Allergies   Allergen Reactions    Latex Rash     Says her skin becomes very red and irritated from latex       Social Determinants of Health:  Social Determinants of Health     Tobacco Use: High Risk (12/4/2023)    Patient History     Smoking Tobacco Use: Every Day     Smokeless Tobacco Use: Never     Passive Exposure: Not on file   Alcohol Use: Heavy Drinker (6/28/2023)    AUDIT-C     Frequency of Alcohol Consumption:

## 2024-12-24 NOTE — Clinical Note
[address] [place] [address] [facility] EMERGENCY DEPT  [id number] [facility] [address] [phone]    Work/School Note    Date: 5/2/2022    To Whom It May concern:    Nona Mahoney was seen and treated today in the emergency room by the following provider(s):  Attending Provider: Princess Dye MD  Physician Assistant: Theresa Heredia, [facility] Erasmo Chang. Nona Mahoney is excused from work/school on 05/02/22 and 05/03/22. She is medically clear to return to work/school on 5/4/2022.        Sincerely,          HESHAM Marin No